# Patient Record
Sex: FEMALE | Race: BLACK OR AFRICAN AMERICAN | NOT HISPANIC OR LATINO | Employment: OTHER | URBAN - METROPOLITAN AREA
[De-identification: names, ages, dates, MRNs, and addresses within clinical notes are randomized per-mention and may not be internally consistent; named-entity substitution may affect disease eponyms.]

---

## 2018-03-15 ENCOUNTER — OFFICE VISIT CONVERTED (OUTPATIENT)
Dept: ORTHOPEDIC SURGERY | Facility: CLINIC | Age: 63
End: 2018-03-15
Attending: ORTHOPAEDIC SURGERY

## 2018-03-15 ENCOUNTER — OFFICE VISIT CONVERTED (OUTPATIENT)
Dept: FAMILY MEDICINE CLINIC | Age: 63
End: 2018-03-15
Attending: NURSE PRACTITIONER

## 2018-07-09 ENCOUNTER — OFFICE VISIT CONVERTED (OUTPATIENT)
Dept: FAMILY MEDICINE CLINIC | Age: 63
End: 2018-07-09
Attending: NURSE PRACTITIONER

## 2018-07-09 ENCOUNTER — CONVERSION ENCOUNTER (OUTPATIENT)
Dept: OTHER | Facility: HOSPITAL | Age: 63
End: 2018-07-09

## 2018-11-12 ENCOUNTER — OFFICE VISIT CONVERTED (OUTPATIENT)
Dept: FAMILY MEDICINE CLINIC | Age: 63
End: 2018-11-12
Attending: NURSE PRACTITIONER

## 2018-11-19 ENCOUNTER — OFFICE VISIT CONVERTED (OUTPATIENT)
Dept: FAMILY MEDICINE CLINIC | Age: 63
End: 2018-11-19
Attending: NURSE PRACTITIONER

## 2019-02-06 ENCOUNTER — OFFICE VISIT CONVERTED (OUTPATIENT)
Dept: FAMILY MEDICINE CLINIC | Age: 64
End: 2019-02-06
Attending: NURSE PRACTITIONER

## 2019-02-06 ENCOUNTER — HOSPITAL ENCOUNTER (OUTPATIENT)
Dept: OTHER | Facility: HOSPITAL | Age: 64
Discharge: HOME | End: 2019-02-06
Attending: NURSE PRACTITIONER

## 2019-02-06 LAB
ANION GAP SERPL CALC-SCNC: 13 MMOL/L (ref 8–19)
BUN SERPL-MCNC: 23 MG/DL (ref 5–25)
BUN/CREAT SERPL: 21 {RATIO} (ref 6–20)
CALCIUM SERPL-MCNC: 9.2 MG/DL (ref 8.7–10.4)
CHLORIDE SERPL-SCNC: 111 MMOL/L (ref 99–111)
CONV CO2: 25 MMOL/L (ref 22–32)
CREAT UR-MCNC: 1.12 MG/DL (ref 0.5–0.9)
GFR SERPLBLD BASED ON 1.73 SQ M-ARVRAT: 60 ML/MIN/{1.73_M2}
GLUCOSE SERPL-MCNC: 81 MG/DL (ref 65–99)
OSMOLALITY SERPL CALC.SUM OF ELEC: 303 MOSM/KG (ref 273–304)
POTASSIUM SERPL-SCNC: 4.4 MMOL/L (ref 3.5–5.3)
SODIUM SERPL-SCNC: 145 MMOL/L (ref 135–147)

## 2019-04-03 ENCOUNTER — OFFICE VISIT CONVERTED (OUTPATIENT)
Dept: FAMILY MEDICINE CLINIC | Age: 64
End: 2019-04-03
Attending: NURSE PRACTITIONER

## 2019-04-29 ENCOUNTER — OFFICE VISIT CONVERTED (OUTPATIENT)
Dept: FAMILY MEDICINE CLINIC | Age: 64
End: 2019-04-29
Attending: NURSE PRACTITIONER

## 2019-07-16 ENCOUNTER — HOSPITAL ENCOUNTER (OUTPATIENT)
Dept: OTHER | Facility: HOSPITAL | Age: 64
Discharge: HOME | End: 2019-07-16
Attending: NURSE PRACTITIONER

## 2019-07-16 ENCOUNTER — OFFICE VISIT CONVERTED (OUTPATIENT)
Dept: FAMILY MEDICINE CLINIC | Age: 64
End: 2019-07-16
Attending: NURSE PRACTITIONER

## 2019-07-16 LAB
ALBUMIN SERPL-MCNC: 3.9 G/DL (ref 3.5–5)
ALBUMIN/GLOB SERPL: 1.1 {RATIO} (ref 1.4–2.6)
ALP SERPL-CCNC: 70 U/L (ref 43–160)
ALT SERPL-CCNC: 8 U/L (ref 10–40)
ANION GAP SERPL CALC-SCNC: 19 MMOL/L (ref 8–19)
AST SERPL-CCNC: 14 U/L (ref 15–50)
BILIRUB SERPL-MCNC: 0.23 MG/DL (ref 0.2–1.3)
BUN SERPL-MCNC: 21 MG/DL (ref 5–25)
BUN/CREAT SERPL: 20 {RATIO} (ref 6–20)
CALCIUM SERPL-MCNC: 9.1 MG/DL (ref 8.7–10.4)
CHLORIDE SERPL-SCNC: 105 MMOL/L (ref 99–111)
CONV CO2: 24 MMOL/L (ref 22–32)
CONV TOTAL PROTEIN: 7.3 G/DL (ref 6.3–8.2)
CREAT UR-MCNC: 1.03 MG/DL (ref 0.5–0.9)
GFR SERPLBLD BASED ON 1.73 SQ M-ARVRAT: >60 ML/MIN/{1.73_M2}
GLOBULIN UR ELPH-MCNC: 3.4 G/DL (ref 2–3.5)
GLUCOSE SERPL-MCNC: 89 MG/DL (ref 65–99)
OSMOLALITY SERPL CALC.SUM OF ELEC: 298 MOSM/KG (ref 273–304)
POTASSIUM SERPL-SCNC: 4.6 MMOL/L (ref 3.5–5.3)
SODIUM SERPL-SCNC: 143 MMOL/L (ref 135–147)
TSH SERPL-ACNC: 1.23 M[IU]/L (ref 0.27–4.2)

## 2019-09-04 ENCOUNTER — OFFICE VISIT CONVERTED (OUTPATIENT)
Dept: FAMILY MEDICINE CLINIC | Age: 64
End: 2019-09-04
Attending: NURSE PRACTITIONER

## 2019-09-12 ENCOUNTER — OFFICE VISIT CONVERTED (OUTPATIENT)
Dept: FAMILY MEDICINE CLINIC | Age: 64
End: 2019-09-12
Attending: NURSE PRACTITIONER

## 2019-10-03 ENCOUNTER — HOSPITAL ENCOUNTER (OUTPATIENT)
Dept: OTHER | Facility: HOSPITAL | Age: 64
Discharge: HOME | End: 2019-10-03
Attending: NURSE PRACTITIONER

## 2019-11-21 ENCOUNTER — OFFICE VISIT CONVERTED (OUTPATIENT)
Dept: FAMILY MEDICINE CLINIC | Age: 64
End: 2019-11-21
Attending: NURSE PRACTITIONER

## 2019-11-22 LAB
BUN SERPL-MCNC: 16 MG/DL
BUN/CREAT SERPL: 15
CALCIUM SERPL-MCNC: 9.9 MG/DL
CHLORIDE SERPL-SCNC: 106 MMOL/L
CO2 SERPL-SCNC: 25 MMOL/L
CREAT UR-MCNC: 1.08 MG/DL
GLUCOSE SERPL-MCNC: 78 MG/DL
MAGNESIUM SERPL-MCNC: 1.9 MG/DL
POTASSIUM SERPL-SCNC: 4.5 MMOL/L
SODIUM SERPL-SCNC: 147 MMOL/L

## 2020-01-22 ENCOUNTER — OFFICE VISIT CONVERTED (OUTPATIENT)
Dept: FAMILY MEDICINE CLINIC | Age: 65
End: 2020-01-22
Attending: NURSE PRACTITIONER

## 2020-03-02 ENCOUNTER — OFFICE VISIT CONVERTED (OUTPATIENT)
Dept: FAMILY MEDICINE CLINIC | Age: 65
End: 2020-03-02
Attending: NURSE PRACTITIONER

## 2020-03-30 ENCOUNTER — OFFICE VISIT CONVERTED (OUTPATIENT)
Dept: FAMILY MEDICINE CLINIC | Age: 65
End: 2020-03-30
Attending: NURSE PRACTITIONER

## 2020-05-11 ENCOUNTER — OFFICE VISIT CONVERTED (OUTPATIENT)
Dept: FAMILY MEDICINE CLINIC | Age: 65
End: 2020-05-11
Attending: NURSE PRACTITIONER

## 2020-06-15 ENCOUNTER — OFFICE VISIT CONVERTED (OUTPATIENT)
Dept: FAMILY MEDICINE CLINIC | Age: 65
End: 2020-06-15
Attending: NURSE PRACTITIONER

## 2020-06-18 LAB
ALBUMIN SERPL-MCNC: 3.9 G/DL
ALBUMIN/GLOB SERPL: 1.4 {RATIO}
ALP SERPL-CCNC: 62 IU/L
ALT SERPL-CCNC: 9 IU/L
AST SERPL-CCNC: 15 IU/L
BILIRUB SERPL-MCNC: 0.4 MG/DL
BUN SERPL-MCNC: 16 MG/DL
BUN/CREAT SERPL: 16
CALCIUM SERPL-MCNC: 9.9 MG/DL
CHLORIDE SERPL-SCNC: 102 MMOL/L
CO2 SERPL-SCNC: 25 MMOL/L
CONV TOTAL PROTEIN: 6.6 G/DL
CREAT UR-MCNC: 1.03 MG/DL
GLOBULIN UR ELPH-MCNC: 2.7 G/DL
GLUCOSE SERPL-MCNC: 110 MG/DL
POTASSIUM SERPL-SCNC: 4.7 MMOL/L
SODIUM SERPL-SCNC: 142 MMOL/L
TSH SERPL-ACNC: 2.03 UIU/ML

## 2020-06-25 ENCOUNTER — OFFICE VISIT CONVERTED (OUTPATIENT)
Dept: FAMILY MEDICINE CLINIC | Age: 65
End: 2020-06-25
Attending: NURSE PRACTITIONER

## 2020-09-15 ENCOUNTER — OFFICE VISIT CONVERTED (OUTPATIENT)
Dept: FAMILY MEDICINE CLINIC | Age: 65
End: 2020-09-15
Attending: NURSE PRACTITIONER

## 2020-11-02 ENCOUNTER — OFFICE VISIT CONVERTED (OUTPATIENT)
Dept: FAMILY MEDICINE CLINIC | Age: 65
End: 2020-11-02
Attending: NURSE PRACTITIONER

## 2021-01-05 ENCOUNTER — OFFICE VISIT CONVERTED (OUTPATIENT)
Dept: FAMILY MEDICINE CLINIC | Age: 66
End: 2021-01-05
Attending: NURSE PRACTITIONER

## 2021-03-15 ENCOUNTER — OFFICE VISIT CONVERTED (OUTPATIENT)
Dept: FAMILY MEDICINE CLINIC | Age: 66
End: 2021-03-15
Attending: NURSE PRACTITIONER

## 2021-03-16 LAB
ALBUMIN SERPL-MCNC: 3.9 G/DL
ALBUMIN/GLOB SERPL: 1.4 {RATIO}
ALP SERPL-CCNC: 66 IU/L
ALT SERPL-CCNC: 12 IU/L
AST SERPL-CCNC: 17 IU/L
BILIRUB SERPL-MCNC: <0.2 MG/DL
BUN SERPL-MCNC: 12 MG/DL
BUN/CREAT SERPL: 12
CALCIUM SERPL-MCNC: 9.6 MG/DL
CHLORIDE SERPL-SCNC: 107 MMOL/L
CHOLEST SERPL-MCNC: 226 MG/DL
CO2 SERPL-SCNC: 25 MMOL/L
CONV TOTAL PROTEIN: 6.7 G/DL
CREAT UR-MCNC: 1 MG/DL
GLOBULIN UR ELPH-MCNC: 2.8 G/DL
GLUCOSE SERPL-MCNC: 115 MG/DL
HDLC SERPL-MCNC: 56 MG/DL
LDLC SERPL CALC-MCNC: 155 MG/DL
POTASSIUM SERPL-SCNC: 4.8 MMOL/L
SODIUM SERPL-SCNC: 144 MMOL/L
TRIGL SERPL-MCNC: 85 MG/DL
VLDLC SERPL-MCNC: 15 MG/DL

## 2021-05-16 VITALS — WEIGHT: 180 LBS | HEIGHT: 64 IN | BODY MASS INDEX: 30.73 KG/M2

## 2021-06-10 ENCOUNTER — TELEPHONE (OUTPATIENT)
Dept: FAMILY MEDICINE CLINIC | Age: 66
End: 2021-06-10

## 2021-06-10 DIAGNOSIS — G47.09 OTHER INSOMNIA: Primary | ICD-10-CM

## 2021-06-14 RX ORDER — ZOLPIDEM TARTRATE 10 MG/1
10 TABLET ORAL NIGHTLY PRN
COMMUNITY
Start: 2021-03-15 | End: 2021-06-14 | Stop reason: SDUPTHER

## 2021-06-16 RX ORDER — ZOLPIDEM TARTRATE 10 MG/1
10 TABLET ORAL NIGHTLY
Qty: 90 TABLET | Refills: 0 | Status: SHIPPED | OUTPATIENT
Start: 2021-06-16 | End: 2021-09-14

## 2021-06-26 RX ORDER — ZOLPIDEM TARTRATE 10 MG/1
TABLET ORAL
Qty: 30 TABLET | OUTPATIENT
Start: 2021-06-26

## 2021-07-01 VITALS
HEIGHT: 65 IN | SYSTOLIC BLOOD PRESSURE: 153 MMHG | TEMPERATURE: 97.9 F | WEIGHT: 283.6 LBS | DIASTOLIC BLOOD PRESSURE: 93 MMHG | BODY MASS INDEX: 47.25 KG/M2 | HEART RATE: 92 BPM

## 2021-07-01 VITALS
TEMPERATURE: 97.9 F | HEART RATE: 88 BPM | BODY MASS INDEX: 44.89 KG/M2 | SYSTOLIC BLOOD PRESSURE: 147 MMHG | WEIGHT: 269.4 LBS | DIASTOLIC BLOOD PRESSURE: 68 MMHG | HEIGHT: 65 IN

## 2021-07-01 VITALS
DIASTOLIC BLOOD PRESSURE: 95 MMHG | WEIGHT: 211 LBS | HEART RATE: 87 BPM | HEIGHT: 65 IN | BODY MASS INDEX: 35.16 KG/M2 | SYSTOLIC BLOOD PRESSURE: 186 MMHG | TEMPERATURE: 97.4 F

## 2021-07-01 VITALS
HEART RATE: 88 BPM | BODY MASS INDEX: 47.15 KG/M2 | WEIGHT: 283 LBS | DIASTOLIC BLOOD PRESSURE: 73 MMHG | TEMPERATURE: 98.5 F | HEIGHT: 65 IN | SYSTOLIC BLOOD PRESSURE: 146 MMHG

## 2021-07-01 VITALS
WEIGHT: 250.4 LBS | HEIGHT: 65 IN | HEART RATE: 72 BPM | SYSTOLIC BLOOD PRESSURE: 144 MMHG | TEMPERATURE: 97.5 F | BODY MASS INDEX: 41.72 KG/M2 | DIASTOLIC BLOOD PRESSURE: 70 MMHG

## 2021-07-01 VITALS
WEIGHT: 276.2 LBS | BODY MASS INDEX: 46.02 KG/M2 | HEART RATE: 81 BPM | SYSTOLIC BLOOD PRESSURE: 147 MMHG | HEIGHT: 65 IN | TEMPERATURE: 98.1 F | DIASTOLIC BLOOD PRESSURE: 76 MMHG

## 2021-07-01 VITALS
HEART RATE: 84 BPM | BODY MASS INDEX: 35.32 KG/M2 | TEMPERATURE: 97.6 F | DIASTOLIC BLOOD PRESSURE: 78 MMHG | SYSTOLIC BLOOD PRESSURE: 138 MMHG | WEIGHT: 212 LBS | HEIGHT: 65 IN

## 2021-07-01 VITALS
HEIGHT: 65 IN | DIASTOLIC BLOOD PRESSURE: 65 MMHG | TEMPERATURE: 98.3 F | WEIGHT: 243.4 LBS | SYSTOLIC BLOOD PRESSURE: 148 MMHG | BODY MASS INDEX: 40.55 KG/M2 | HEART RATE: 76 BPM

## 2021-07-01 VITALS
BODY MASS INDEX: 33.72 KG/M2 | HEIGHT: 65 IN | DIASTOLIC BLOOD PRESSURE: 86 MMHG | HEART RATE: 99 BPM | SYSTOLIC BLOOD PRESSURE: 136 MMHG | WEIGHT: 202.4 LBS | TEMPERATURE: 98.9 F

## 2021-07-01 VITALS
TEMPERATURE: 98.3 F | WEIGHT: 223.8 LBS | HEIGHT: 65 IN | DIASTOLIC BLOOD PRESSURE: 66 MMHG | SYSTOLIC BLOOD PRESSURE: 146 MMHG | BODY MASS INDEX: 37.29 KG/M2 | HEART RATE: 85 BPM

## 2021-07-01 VITALS
DIASTOLIC BLOOD PRESSURE: 85 MMHG | HEIGHT: 65 IN | HEART RATE: 81 BPM | TEMPERATURE: 98.5 F | WEIGHT: 206.8 LBS | SYSTOLIC BLOOD PRESSURE: 135 MMHG | BODY MASS INDEX: 34.45 KG/M2

## 2021-07-02 VITALS
HEIGHT: 65 IN | DIASTOLIC BLOOD PRESSURE: 88 MMHG | HEART RATE: 79 BPM | BODY MASS INDEX: 48.42 KG/M2 | TEMPERATURE: 98.2 F | WEIGHT: 290.6 LBS | SYSTOLIC BLOOD PRESSURE: 153 MMHG

## 2021-07-02 VITALS
HEART RATE: 90 BPM | WEIGHT: 293 LBS | DIASTOLIC BLOOD PRESSURE: 96 MMHG | SYSTOLIC BLOOD PRESSURE: 169 MMHG | HEIGHT: 65 IN | BODY MASS INDEX: 48.82 KG/M2

## 2021-07-02 VITALS
WEIGHT: 293 LBS | HEIGHT: 65 IN | SYSTOLIC BLOOD PRESSURE: 123 MMHG | DIASTOLIC BLOOD PRESSURE: 97 MMHG | BODY MASS INDEX: 48.82 KG/M2 | HEART RATE: 77 BPM | TEMPERATURE: 97.6 F

## 2021-07-02 VITALS
WEIGHT: 293 LBS | SYSTOLIC BLOOD PRESSURE: 151 MMHG | HEART RATE: 80 BPM | TEMPERATURE: 94.9 F | HEIGHT: 65 IN | BODY MASS INDEX: 48.82 KG/M2 | DIASTOLIC BLOOD PRESSURE: 98 MMHG

## 2021-07-02 VITALS
DIASTOLIC BLOOD PRESSURE: 82 MMHG | WEIGHT: 288.2 LBS | TEMPERATURE: 97.9 F | BODY MASS INDEX: 48.02 KG/M2 | SYSTOLIC BLOOD PRESSURE: 149 MMHG | HEIGHT: 65 IN | HEART RATE: 87 BPM

## 2021-07-28 ENCOUNTER — OFFICE VISIT (OUTPATIENT)
Dept: FAMILY MEDICINE CLINIC | Age: 66
End: 2021-07-28

## 2021-07-28 VITALS
SYSTOLIC BLOOD PRESSURE: 153 MMHG | TEMPERATURE: 97.7 F | DIASTOLIC BLOOD PRESSURE: 92 MMHG | BODY MASS INDEX: 49.52 KG/M2 | WEIGHT: 293 LBS | HEART RATE: 79 BPM

## 2021-07-28 DIAGNOSIS — F41.8 ANXIETY WITH DEPRESSION: Primary | ICD-10-CM

## 2021-07-28 DIAGNOSIS — I10 ESSENTIAL (PRIMARY) HYPERTENSION: ICD-10-CM

## 2021-07-28 PROCEDURE — 99213 OFFICE O/P EST LOW 20 MIN: CPT | Performed by: NURSE PRACTITIONER

## 2021-07-28 RX ORDER — AMLODIPINE BESYLATE AND BENAZEPRIL HYDROCHLORIDE 5; 20 MG/1; MG/1
1 CAPSULE ORAL DAILY
COMMUNITY
End: 2022-02-22 | Stop reason: SDUPTHER

## 2021-07-28 RX ORDER — BUPROPION HYDROCHLORIDE 150 MG/1
150 TABLET ORAL DAILY
COMMUNITY
Start: 2021-05-07 | End: 2021-07-28 | Stop reason: DRUGHIGH

## 2021-07-28 RX ORDER — ESCITALOPRAM OXALATE 20 MG/1
20 TABLET ORAL DAILY
COMMUNITY
End: 2021-11-22

## 2021-07-28 RX ORDER — FUROSEMIDE 20 MG/1
20 TABLET ORAL DAILY PRN
COMMUNITY
Start: 2021-05-29 | End: 2023-03-21 | Stop reason: SDUPTHER

## 2021-07-28 RX ORDER — BUPROPION HYDROCHLORIDE 300 MG/1
300 TABLET ORAL DAILY
Qty: 90 TABLET | Refills: 1 | Status: SHIPPED | OUTPATIENT
Start: 2021-07-28 | End: 2021-11-22

## 2021-07-28 RX ORDER — NABUMETONE 500 MG/1
500 TABLET, FILM COATED ORAL 2 TIMES DAILY WITH MEALS
COMMUNITY
Start: 2021-06-03 | End: 2021-09-01

## 2021-08-03 PROBLEM — M19.90 ARTHRITIS: Status: ACTIVE | Noted: 2021-08-03

## 2021-08-03 PROBLEM — E66.01 MORBID (SEVERE) OBESITY DUE TO EXCESS CALORIES: Status: ACTIVE | Noted: 2019-01-14

## 2021-08-03 PROBLEM — M25.559 HIP PAIN: Status: ACTIVE | Noted: 2021-08-03

## 2021-08-03 PROBLEM — L71.9 ACNE ROSACEA: Status: ACTIVE | Noted: 2021-08-03

## 2021-08-03 PROBLEM — R53.83 FATIGUE: Status: ACTIVE | Noted: 2021-08-03

## 2021-08-03 PROBLEM — F41.9 ANXIETY: Status: ACTIVE | Noted: 2021-08-03

## 2021-08-03 PROBLEM — Z96.651 HISTORY OF TOTAL KNEE REPLACEMENT, RIGHT: Status: ACTIVE | Noted: 2018-03-16

## 2021-08-03 PROBLEM — J30.2 SEASONAL ALLERGIC RHINITIS: Status: ACTIVE | Noted: 2021-08-03

## 2021-08-03 RX ORDER — CELECOXIB 100 MG/1
100 CAPSULE ORAL
COMMUNITY
End: 2021-09-01 | Stop reason: ALTCHOICE

## 2021-08-09 ENCOUNTER — PREP FOR SURGERY (OUTPATIENT)
Dept: OTHER | Facility: HOSPITAL | Age: 66
End: 2021-08-09

## 2021-08-09 ENCOUNTER — CLINICAL SUPPORT (OUTPATIENT)
Dept: GASTROENTEROLOGY | Facility: CLINIC | Age: 66
End: 2021-08-09

## 2021-08-09 DIAGNOSIS — Z12.11 COLON CANCER SCREENING: Primary | ICD-10-CM

## 2021-08-09 RX ORDER — SODIUM, POTASSIUM,MAG SULFATES 17.5-3.13G
1 SOLUTION, RECONSTITUTED, ORAL ORAL EVERY 12 HOURS
Qty: 254 ML | Refills: 0 | Status: SHIPPED | OUTPATIENT
Start: 2021-08-09 | End: 2021-11-10

## 2021-09-01 RX ORDER — NABUMETONE 500 MG/1
TABLET, FILM COATED ORAL
Qty: 60 TABLET | Refills: 0 | Status: SHIPPED | OUTPATIENT
Start: 2021-09-01 | End: 2021-09-03

## 2021-09-03 RX ORDER — NABUMETONE 500 MG/1
500 TABLET, FILM COATED ORAL 2 TIMES DAILY WITH MEALS
Qty: 180 TABLET | Refills: 0 | Status: SHIPPED | OUTPATIENT
Start: 2021-09-03 | End: 2022-01-24 | Stop reason: ALTCHOICE

## 2021-09-09 RX ORDER — CELECOXIB 200 MG/1
200 CAPSULE ORAL
Qty: 90 CAPSULE | Refills: 0 | Status: CANCELLED | OUTPATIENT
Start: 2021-09-09

## 2021-09-14 DIAGNOSIS — G47.09 OTHER INSOMNIA: ICD-10-CM

## 2021-09-14 RX ORDER — ZOLPIDEM TARTRATE 10 MG/1
TABLET ORAL
Qty: 90 TABLET | Refills: 0 | Status: SHIPPED | OUTPATIENT
Start: 2021-09-14 | End: 2021-12-17

## 2021-11-10 ENCOUNTER — OFFICE VISIT (OUTPATIENT)
Dept: FAMILY MEDICINE CLINIC | Age: 66
End: 2021-11-10

## 2021-11-10 ENCOUNTER — OFFICE VISIT (OUTPATIENT)
Dept: CARDIOLOGY | Facility: CLINIC | Age: 66
End: 2021-11-10

## 2021-11-10 VITALS
HEART RATE: 111 BPM | DIASTOLIC BLOOD PRESSURE: 95 MMHG | WEIGHT: 293 LBS | HEIGHT: 64 IN | BODY MASS INDEX: 50.02 KG/M2 | SYSTOLIC BLOOD PRESSURE: 157 MMHG

## 2021-11-10 VITALS
DIASTOLIC BLOOD PRESSURE: 83 MMHG | HEIGHT: 65 IN | BODY MASS INDEX: 48.82 KG/M2 | HEART RATE: 90 BPM | SYSTOLIC BLOOD PRESSURE: 149 MMHG | WEIGHT: 293 LBS

## 2021-11-10 DIAGNOSIS — E66.01 MORBID (SEVERE) OBESITY DUE TO EXCESS CALORIES (HCC): Primary | ICD-10-CM

## 2021-11-10 DIAGNOSIS — R06.09 EXERTIONAL DYSPNEA: ICD-10-CM

## 2021-11-10 DIAGNOSIS — Z23 NEED FOR COVID-19 VACCINE: ICD-10-CM

## 2021-11-10 DIAGNOSIS — Z01.810 PREOPERATIVE CARDIOVASCULAR EXAMINATION: Primary | ICD-10-CM

## 2021-11-10 DIAGNOSIS — I10 ESSENTIAL HYPERTENSION: ICD-10-CM

## 2021-11-10 DIAGNOSIS — I10 ESSENTIAL (PRIMARY) HYPERTENSION: ICD-10-CM

## 2021-11-10 DIAGNOSIS — Z23 IMMUNIZATION DUE: ICD-10-CM

## 2021-11-10 DIAGNOSIS — E66.01 MORBID OBESITY WITH BMI OF 50.0-59.9, ADULT (HCC): ICD-10-CM

## 2021-11-10 DIAGNOSIS — F41.9 ANXIETY: ICD-10-CM

## 2021-11-10 PROCEDURE — 93000 ELECTROCARDIOGRAM COMPLETE: CPT | Performed by: INTERNAL MEDICINE

## 2021-11-10 PROCEDURE — 0003A COVID-19 (PFIZER): CPT | Performed by: NURSE PRACTITIONER

## 2021-11-10 PROCEDURE — 99204 OFFICE O/P NEW MOD 45 MIN: CPT | Performed by: INTERNAL MEDICINE

## 2021-11-10 PROCEDURE — 91300 COVID-19 (PFIZER): CPT | Performed by: NURSE PRACTITIONER

## 2021-11-10 PROCEDURE — 99213 OFFICE O/P EST LOW 20 MIN: CPT | Performed by: NURSE PRACTITIONER

## 2021-11-17 ENCOUNTER — TELEPHONE (OUTPATIENT)
Dept: CARDIOLOGY | Facility: CLINIC | Age: 66
End: 2021-11-17

## 2021-11-20 DIAGNOSIS — F41.8 ANXIETY WITH DEPRESSION: ICD-10-CM

## 2021-11-22 RX ORDER — ESCITALOPRAM OXALATE 20 MG/1
TABLET ORAL
Qty: 90 TABLET | Refills: 0 | Status: SHIPPED | OUTPATIENT
Start: 2021-11-22 | End: 2022-09-19

## 2021-11-22 RX ORDER — BUPROPION HYDROCHLORIDE 300 MG/1
300 TABLET ORAL DAILY
Qty: 90 TABLET | Refills: 1 | Status: SHIPPED | OUTPATIENT
Start: 2021-11-22 | End: 2022-08-17

## 2021-12-08 ENCOUNTER — OFFICE VISIT (OUTPATIENT)
Dept: FAMILY MEDICINE CLINIC | Age: 66
End: 2021-12-08

## 2021-12-08 VITALS
WEIGHT: 291 LBS | BODY MASS INDEX: 49.95 KG/M2 | SYSTOLIC BLOOD PRESSURE: 160 MMHG | HEART RATE: 72 BPM | DIASTOLIC BLOOD PRESSURE: 95 MMHG

## 2021-12-08 DIAGNOSIS — Z96.651 HISTORY OF TOTAL KNEE REPLACEMENT, RIGHT: ICD-10-CM

## 2021-12-08 DIAGNOSIS — Z12.31 ENCOUNTER FOR SCREENING MAMMOGRAM FOR MALIGNANT NEOPLASM OF BREAST: Primary | ICD-10-CM

## 2021-12-08 DIAGNOSIS — Z00.00 ROUTINE GENERAL MEDICAL EXAMINATION AT A HEALTH CARE FACILITY: ICD-10-CM

## 2021-12-08 DIAGNOSIS — Z78.0 POSTMENOPAUSAL: ICD-10-CM

## 2021-12-08 DIAGNOSIS — I10 ESSENTIAL (PRIMARY) HYPERTENSION: ICD-10-CM

## 2021-12-08 DIAGNOSIS — E66.01 MORBID (SEVERE) OBESITY DUE TO EXCESS CALORIES (HCC): ICD-10-CM

## 2021-12-08 DIAGNOSIS — Z12.11 SCREENING FOR COLON CANCER: ICD-10-CM

## 2021-12-08 PROCEDURE — 1170F FXNL STATUS ASSESSED: CPT | Performed by: NURSE PRACTITIONER

## 2021-12-08 PROCEDURE — G0439 PPPS, SUBSEQ VISIT: HCPCS | Performed by: NURSE PRACTITIONER

## 2021-12-08 PROCEDURE — 1159F MED LIST DOCD IN RCRD: CPT | Performed by: NURSE PRACTITIONER

## 2021-12-08 PROCEDURE — 96160 PT-FOCUSED HLTH RISK ASSMT: CPT | Performed by: NURSE PRACTITIONER

## 2021-12-11 DIAGNOSIS — G47.09 OTHER INSOMNIA: ICD-10-CM

## 2021-12-14 ENCOUNTER — TRANSCRIBE ORDERS (OUTPATIENT)
Dept: ADMINISTRATIVE | Facility: HOSPITAL | Age: 66
End: 2021-12-14

## 2021-12-14 DIAGNOSIS — Z12.31 BREAST CANCER SCREENING BY MAMMOGRAM: Primary | ICD-10-CM

## 2021-12-15 ENCOUNTER — TELEPHONE (OUTPATIENT)
Dept: FAMILY MEDICINE CLINIC | Age: 66
End: 2021-12-15

## 2021-12-16 ENCOUNTER — APPOINTMENT (OUTPATIENT)
Dept: NUCLEAR MEDICINE | Facility: HOSPITAL | Age: 66
End: 2021-12-16

## 2021-12-17 ENCOUNTER — APPOINTMENT (OUTPATIENT)
Dept: NUCLEAR MEDICINE | Facility: HOSPITAL | Age: 66
End: 2021-12-17

## 2021-12-17 RX ORDER — ZOLPIDEM TARTRATE 10 MG/1
TABLET ORAL
Qty: 90 TABLET | Refills: 0 | Status: SHIPPED | OUTPATIENT
Start: 2021-12-17 | End: 2022-03-11 | Stop reason: SDUPTHER

## 2022-01-24 ENCOUNTER — TELEPHONE (OUTPATIENT)
Dept: FAMILY MEDICINE CLINIC | Age: 67
End: 2022-01-24

## 2022-01-24 ENCOUNTER — TELEMEDICINE (OUTPATIENT)
Dept: FAMILY MEDICINE CLINIC | Age: 67
End: 2022-01-24

## 2022-01-24 DIAGNOSIS — M54.9 MID-BACK PAIN, ACUTE: ICD-10-CM

## 2022-01-24 DIAGNOSIS — M54.9 MID-BACK PAIN, ACUTE: Primary | ICD-10-CM

## 2022-01-24 DIAGNOSIS — I10 ESSENTIAL (PRIMARY) HYPERTENSION: ICD-10-CM

## 2022-01-24 PROBLEM — M54.50 ACUTE BILATERAL LOW BACK PAIN: Status: ACTIVE | Noted: 2022-01-24

## 2022-01-24 PROCEDURE — 99213 OFFICE O/P EST LOW 20 MIN: CPT | Performed by: NURSE PRACTITIONER

## 2022-01-24 RX ORDER — CELECOXIB 200 MG/1
200 CAPSULE ORAL
Qty: 30 CAPSULE | Refills: 1 | Status: SHIPPED | OUTPATIENT
Start: 2022-01-24 | End: 2022-01-24 | Stop reason: DRUGHIGH

## 2022-01-24 RX ORDER — CELECOXIB 100 MG/1
100 CAPSULE ORAL 2 TIMES DAILY WITH MEALS
Qty: 60 CAPSULE | Refills: 0 | Status: SHIPPED | OUTPATIENT
Start: 2022-01-24 | End: 2022-01-24 | Stop reason: SDUPTHER

## 2022-01-24 RX ORDER — CELECOXIB 100 MG/1
100 CAPSULE ORAL 2 TIMES DAILY WITH MEALS
Qty: 60 CAPSULE | Refills: 0 | Status: SHIPPED | OUTPATIENT
Start: 2022-01-24 | End: 2022-02-03 | Stop reason: ALTCHOICE

## 2022-01-24 RX ORDER — METHOCARBAMOL 500 MG/1
500 TABLET, FILM COATED ORAL 3 TIMES DAILY PRN
Qty: 60 TABLET | Refills: 1 | Status: SHIPPED | OUTPATIENT
Start: 2022-01-24 | End: 2022-09-27

## 2022-01-25 ENCOUNTER — TELEPHONE (OUTPATIENT)
Dept: FAMILY MEDICINE CLINIC | Age: 67
End: 2022-01-25

## 2022-01-25 DIAGNOSIS — M54.50 ACUTE BILATERAL LOW BACK PAIN, UNSPECIFIED WHETHER SCIATICA PRESENT: Primary | ICD-10-CM

## 2022-01-25 RX ORDER — IBUPROFEN 800 MG/1
800 TABLET ORAL 3 TIMES DAILY PRN
Qty: 30 TABLET | Refills: 0 | Status: SHIPPED | OUTPATIENT
Start: 2022-01-25 | End: 2022-03-22 | Stop reason: ALTCHOICE

## 2022-02-01 LAB
ALBUMIN SERPL-MCNC: 3.9 G/DL (ref 3.8–4.8)
ALBUMIN/GLOB SERPL: 1.4 {RATIO} (ref 1.2–2.2)
ALP SERPL-CCNC: 71 IU/L (ref 44–121)
ALT SERPL-CCNC: 10 IU/L (ref 0–32)
AMBIG ABBREV CMP14 DEFAULT: NORMAL
AMBIG ABBREV LP DEFAULT: NORMAL
AST SERPL-CCNC: 12 IU/L (ref 0–40)
BILIRUB SERPL-MCNC: <0.2 MG/DL (ref 0–1.2)
BUN SERPL-MCNC: 15 MG/DL (ref 8–27)
BUN/CREAT SERPL: 16 (ref 12–28)
CALCIUM SERPL-MCNC: 9.3 MG/DL (ref 8.7–10.3)
CHLORIDE SERPL-SCNC: 107 MMOL/L (ref 96–106)
CHOLEST SERPL-MCNC: 218 MG/DL (ref 100–199)
CO2 SERPL-SCNC: 23 MMOL/L (ref 20–29)
CREAT SERPL-MCNC: 0.92 MG/DL (ref 0.57–1)
GLOBULIN SER CALC-MCNC: 2.7 G/DL (ref 1.5–4.5)
GLUCOSE SERPL-MCNC: 94 MG/DL (ref 65–99)
HDLC SERPL-MCNC: 52 MG/DL
LDLC SERPL CALC-MCNC: 147 MG/DL (ref 0–99)
POTASSIUM SERPL-SCNC: 4.3 MMOL/L (ref 3.5–5.2)
PROT SERPL-MCNC: 6.6 G/DL (ref 6–8.5)
SODIUM SERPL-SCNC: 145 MMOL/L (ref 134–144)
TRIGL SERPL-MCNC: 104 MG/DL (ref 0–149)
VLDLC SERPL CALC-MCNC: 19 MG/DL (ref 5–40)

## 2022-02-02 ENCOUNTER — OFFICE VISIT (OUTPATIENT)
Dept: FAMILY MEDICINE CLINIC | Age: 67
End: 2022-02-02

## 2022-02-02 VITALS
SYSTOLIC BLOOD PRESSURE: 158 MMHG | DIASTOLIC BLOOD PRESSURE: 88 MMHG | BODY MASS INDEX: 49.85 KG/M2 | HEIGHT: 64 IN | HEART RATE: 80 BPM | TEMPERATURE: 98.6 F | OXYGEN SATURATION: 97 % | WEIGHT: 292 LBS

## 2022-02-02 DIAGNOSIS — I10 ESSENTIAL (PRIMARY) HYPERTENSION: Primary | ICD-10-CM

## 2022-02-02 DIAGNOSIS — M54.50 ACUTE BILATERAL LOW BACK PAIN WITHOUT SCIATICA: ICD-10-CM

## 2022-02-02 DIAGNOSIS — E66.01 MORBID OBESITY WITH BMI OF 50.0-59.9, ADULT: ICD-10-CM

## 2022-02-02 DIAGNOSIS — E66.01 MORBID (SEVERE) OBESITY DUE TO EXCESS CALORIES: ICD-10-CM

## 2022-02-02 DIAGNOSIS — Z98.84 STATUS FOLLOWING GASTRIC BANDING SURGERY FOR WEIGHT LOSS: ICD-10-CM

## 2022-02-02 PROCEDURE — 99214 OFFICE O/P EST MOD 30 MIN: CPT | Performed by: NURSE PRACTITIONER

## 2022-02-22 DIAGNOSIS — I10 ESSENTIAL (PRIMARY) HYPERTENSION: Primary | ICD-10-CM

## 2022-02-22 RX ORDER — AMLODIPINE BESYLATE AND BENAZEPRIL HYDROCHLORIDE 5; 20 MG/1; MG/1
1 CAPSULE ORAL DAILY
Qty: 90 CAPSULE | Refills: 1 | Status: SHIPPED | OUTPATIENT
Start: 2022-02-22 | End: 2022-09-26 | Stop reason: SDUPTHER

## 2022-03-03 DIAGNOSIS — G47.09 OTHER INSOMNIA: ICD-10-CM

## 2022-03-07 DIAGNOSIS — G47.09 OTHER INSOMNIA: ICD-10-CM

## 2022-03-07 RX ORDER — ZOLPIDEM TARTRATE 10 MG/1
TABLET ORAL
Qty: 90 TABLET | OUTPATIENT
Start: 2022-03-07

## 2022-03-14 RX ORDER — ZOLPIDEM TARTRATE 10 MG/1
10 TABLET ORAL NIGHTLY
Qty: 90 TABLET | Refills: 0 | Status: SHIPPED | OUTPATIENT
Start: 2022-03-14 | End: 2022-06-14

## 2022-03-22 DIAGNOSIS — M54.9 MID-BACK PAIN, ACUTE: ICD-10-CM

## 2022-03-22 RX ORDER — CELECOXIB 100 MG/1
100 CAPSULE ORAL 2 TIMES DAILY WITH MEALS
COMMUNITY
Start: 2022-02-22 | End: 2022-03-22 | Stop reason: ALTCHOICE

## 2022-03-22 RX ORDER — CELECOXIB 100 MG/1
CAPSULE ORAL
Qty: 60 CAPSULE | Refills: 0 | Status: SHIPPED | OUTPATIENT
Start: 2022-03-22 | End: 2022-05-03

## 2022-04-05 ENCOUNTER — TELEMEDICINE (OUTPATIENT)
Dept: FAMILY MEDICINE CLINIC | Age: 67
End: 2022-04-05

## 2022-04-05 DIAGNOSIS — R05.9 COUGH: Primary | ICD-10-CM

## 2022-04-05 PROCEDURE — 99213 OFFICE O/P EST LOW 20 MIN: CPT | Performed by: NURSE PRACTITIONER

## 2022-04-05 RX ORDER — BENZONATATE 100 MG/1
100 CAPSULE ORAL 3 TIMES DAILY PRN
Qty: 30 CAPSULE | Refills: 0 | Status: SHIPPED | OUTPATIENT
Start: 2022-04-05 | End: 2022-05-17

## 2022-04-05 RX ORDER — AZITHROMYCIN 250 MG/1
TABLET, FILM COATED ORAL
Qty: 6 TABLET | Refills: 0 | Status: SHIPPED | OUTPATIENT
Start: 2022-04-05 | End: 2022-05-17

## 2022-04-05 RX ORDER — METHYLPREDNISOLONE 4 MG/1
TABLET ORAL
Qty: 21 EACH | Refills: 0 | Status: SHIPPED | OUTPATIENT
Start: 2022-04-05 | End: 2022-05-17

## 2022-04-15 ENCOUNTER — TELEPHONE (OUTPATIENT)
Dept: FAMILY MEDICINE CLINIC | Age: 67
End: 2022-04-15

## 2022-04-25 ENCOUNTER — TELEPHONE (OUTPATIENT)
Dept: FAMILY MEDICINE CLINIC | Age: 67
End: 2022-04-25

## 2022-05-03 DIAGNOSIS — M54.9 MID-BACK PAIN, ACUTE: ICD-10-CM

## 2022-05-03 RX ORDER — CELECOXIB 100 MG/1
CAPSULE ORAL
Qty: 60 CAPSULE | Refills: 0 | Status: SHIPPED | OUTPATIENT
Start: 2022-05-03 | End: 2022-06-21

## 2022-05-12 ENCOUNTER — TELEPHONE (OUTPATIENT)
Dept: FAMILY MEDICINE CLINIC | Age: 67
End: 2022-05-12

## 2022-05-17 ENCOUNTER — OFFICE VISIT (OUTPATIENT)
Dept: FAMILY MEDICINE CLINIC | Age: 67
End: 2022-05-17

## 2022-05-17 VITALS
DIASTOLIC BLOOD PRESSURE: 93 MMHG | BODY MASS INDEX: 51.01 KG/M2 | HEART RATE: 76 BPM | WEIGHT: 293 LBS | SYSTOLIC BLOOD PRESSURE: 154 MMHG

## 2022-05-17 DIAGNOSIS — Z79.899 HIGH RISK MEDICATION USE: ICD-10-CM

## 2022-05-17 DIAGNOSIS — F41.8 ANXIETY WITH DEPRESSION: ICD-10-CM

## 2022-05-17 DIAGNOSIS — I10 ESSENTIAL (PRIMARY) HYPERTENSION: Primary | ICD-10-CM

## 2022-05-17 DIAGNOSIS — E65 CENTRAL OBESITY: ICD-10-CM

## 2022-05-17 LAB
AMPHET+METHAMPHET UR QL: NEGATIVE
AMPHETAMINES UR QL: NEGATIVE
BARBITURATES UR QL SCN: NEGATIVE
BENZODIAZ UR QL SCN: NEGATIVE
BUPRENORPHINE SERPL-MCNC: NEGATIVE NG/ML
CANNABINOIDS SERPL QL: POSITIVE
COCAINE UR QL: NEGATIVE
EXPIRATION DATE: ABNORMAL
Lab: ABNORMAL
MDMA UR QL SCN: NEGATIVE
METHADONE UR QL SCN: NEGATIVE
OPIATES UR QL: NEGATIVE
OXYCODONE UR QL SCN: NEGATIVE
PCP UR QL SCN: NEGATIVE

## 2022-05-17 PROCEDURE — 99214 OFFICE O/P EST MOD 30 MIN: CPT | Performed by: NURSE PRACTITIONER

## 2022-05-17 PROCEDURE — 80305 DRUG TEST PRSMV DIR OPT OBS: CPT | Performed by: NURSE PRACTITIONER

## 2022-06-07 ENCOUNTER — TELEPHONE (OUTPATIENT)
Dept: FAMILY MEDICINE CLINIC | Age: 67
End: 2022-06-07

## 2022-06-09 ENCOUNTER — TELEPHONE (OUTPATIENT)
Dept: FAMILY MEDICINE CLINIC | Age: 67
End: 2022-06-09

## 2022-06-14 DIAGNOSIS — G47.09 OTHER INSOMNIA: ICD-10-CM

## 2022-06-14 RX ORDER — ZOLPIDEM TARTRATE 10 MG/1
10 TABLET ORAL NIGHTLY
Qty: 90 TABLET | Refills: 0 | Status: SHIPPED | OUTPATIENT
Start: 2022-06-14 | End: 2022-09-12

## 2022-06-16 ENCOUNTER — OFFICE VISIT (OUTPATIENT)
Dept: FAMILY MEDICINE CLINIC | Age: 67
End: 2022-06-16

## 2022-06-16 VITALS
HEART RATE: 78 BPM | DIASTOLIC BLOOD PRESSURE: 98 MMHG | HEIGHT: 64 IN | BODY MASS INDEX: 49.82 KG/M2 | WEIGHT: 291.8 LBS | SYSTOLIC BLOOD PRESSURE: 153 MMHG | OXYGEN SATURATION: 95 % | TEMPERATURE: 98.2 F

## 2022-06-16 DIAGNOSIS — U07.1 COVID-19: ICD-10-CM

## 2022-06-16 DIAGNOSIS — I10 ESSENTIAL (PRIMARY) HYPERTENSION: ICD-10-CM

## 2022-06-16 DIAGNOSIS — R05.9 COUGH: ICD-10-CM

## 2022-06-16 DIAGNOSIS — Z20.822 CLOSE EXPOSURE TO COVID-19 VIRUS: Primary | ICD-10-CM

## 2022-06-16 LAB
EXPIRATION DATE: ABNORMAL
FLUAV AG UPPER RESP QL IA.RAPID: NOT DETECTED
FLUBV AG UPPER RESP QL IA.RAPID: NOT DETECTED
INTERNAL CONTROL: ABNORMAL
Lab: ABNORMAL
SARS-COV-2 AG UPPER RESP QL IA.RAPID: DETECTED

## 2022-06-16 PROCEDURE — 87428 SARSCOV & INF VIR A&B AG IA: CPT | Performed by: NURSE PRACTITIONER

## 2022-06-16 PROCEDURE — 99213 OFFICE O/P EST LOW 20 MIN: CPT | Performed by: NURSE PRACTITIONER

## 2022-06-20 DIAGNOSIS — M54.9 MID-BACK PAIN, ACUTE: ICD-10-CM

## 2022-06-21 RX ORDER — CELECOXIB 100 MG/1
CAPSULE ORAL
Qty: 60 CAPSULE | Refills: 0 | Status: SHIPPED | OUTPATIENT
Start: 2022-06-21 | End: 2022-08-10

## 2022-07-13 ENCOUNTER — LAB (OUTPATIENT)
Dept: LAB | Facility: HOSPITAL | Age: 67
End: 2022-07-13

## 2022-07-13 DIAGNOSIS — I10 ESSENTIAL (PRIMARY) HYPERTENSION: ICD-10-CM

## 2022-07-13 LAB
ALBUMIN SERPL-MCNC: 4.3 G/DL (ref 3.5–5.2)
ALBUMIN/GLOB SERPL: 1.4 G/DL
ALP SERPL-CCNC: 74 U/L (ref 39–117)
ALT SERPL W P-5'-P-CCNC: 10 U/L (ref 1–33)
ANION GAP SERPL CALCULATED.3IONS-SCNC: 9.8 MMOL/L (ref 5–15)
AST SERPL-CCNC: 13 U/L (ref 1–32)
BILIRUB SERPL-MCNC: 0.3 MG/DL (ref 0–1.2)
BUN SERPL-MCNC: 16 MG/DL (ref 8–23)
BUN/CREAT SERPL: 14.5 (ref 7–25)
CALCIUM SPEC-SCNC: 9.7 MG/DL (ref 8.6–10.5)
CHLORIDE SERPL-SCNC: 106 MMOL/L (ref 98–107)
CHOLEST SERPL-MCNC: 230 MG/DL (ref 0–200)
CO2 SERPL-SCNC: 26.2 MMOL/L (ref 22–29)
CREAT SERPL-MCNC: 1.1 MG/DL (ref 0.57–1)
EGFRCR SERPLBLD CKD-EPI 2021: 55.2 ML/MIN/1.73
GLOBULIN UR ELPH-MCNC: 3.1 GM/DL
GLUCOSE SERPL-MCNC: 94 MG/DL (ref 65–99)
HDLC SERPL-MCNC: 54 MG/DL (ref 40–60)
LDLC SERPL CALC-MCNC: 159 MG/DL (ref 0–100)
LDLC/HDLC SERPL: 2.9 {RATIO}
POTASSIUM SERPL-SCNC: 4.3 MMOL/L (ref 3.5–5.2)
PROT SERPL-MCNC: 7.4 G/DL (ref 6–8.5)
SODIUM SERPL-SCNC: 142 MMOL/L (ref 136–145)
TRIGL SERPL-MCNC: 97 MG/DL (ref 0–150)
VLDLC SERPL-MCNC: 17 MG/DL (ref 5–40)

## 2022-07-13 PROCEDURE — 36415 COLL VENOUS BLD VENIPUNCTURE: CPT

## 2022-07-13 PROCEDURE — 80061 LIPID PANEL: CPT

## 2022-07-13 PROCEDURE — 80053 COMPREHEN METABOLIC PANEL: CPT

## 2022-08-10 DIAGNOSIS — G47.09 OTHER INSOMNIA: ICD-10-CM

## 2022-08-10 DIAGNOSIS — M54.9 MID-BACK PAIN, ACUTE: ICD-10-CM

## 2022-08-11 RX ORDER — ZOLPIDEM TARTRATE 10 MG/1
10 TABLET ORAL NIGHTLY
Qty: 90 TABLET | OUTPATIENT
Start: 2022-08-11

## 2022-08-11 RX ORDER — CELECOXIB 100 MG/1
CAPSULE ORAL
Qty: 60 CAPSULE | Refills: 2 | Status: SHIPPED | OUTPATIENT
Start: 2022-08-11 | End: 2023-03-08

## 2022-08-17 DIAGNOSIS — F41.8 ANXIETY WITH DEPRESSION: ICD-10-CM

## 2022-08-19 RX ORDER — BUPROPION HYDROCHLORIDE 300 MG/1
300 TABLET ORAL DAILY
Qty: 90 TABLET | Refills: 0 | Status: SHIPPED | OUTPATIENT
Start: 2022-08-19 | End: 2023-03-21 | Stop reason: SDUPTHER

## 2022-09-12 DIAGNOSIS — G47.09 OTHER INSOMNIA: ICD-10-CM

## 2022-09-12 RX ORDER — ZOLPIDEM TARTRATE 10 MG/1
10 TABLET ORAL NIGHTLY
Qty: 90 TABLET | Refills: 0 | Status: SHIPPED | OUTPATIENT
Start: 2022-09-12 | End: 2023-01-06

## 2022-09-19 RX ORDER — ESCITALOPRAM OXALATE 20 MG/1
TABLET ORAL
Qty: 90 TABLET | Refills: 0 | Status: SHIPPED | OUTPATIENT
Start: 2022-09-19 | End: 2022-12-16

## 2022-09-26 ENCOUNTER — OFFICE VISIT (OUTPATIENT)
Dept: FAMILY MEDICINE CLINIC | Age: 67
End: 2022-09-26

## 2022-09-26 ENCOUNTER — LAB (OUTPATIENT)
Dept: LAB | Facility: HOSPITAL | Age: 67
End: 2022-09-26

## 2022-09-26 VITALS
SYSTOLIC BLOOD PRESSURE: 149 MMHG | RESPIRATION RATE: 20 BRPM | WEIGHT: 290.8 LBS | DIASTOLIC BLOOD PRESSURE: 103 MMHG | HEART RATE: 85 BPM | HEIGHT: 64 IN | BODY MASS INDEX: 49.64 KG/M2

## 2022-09-26 DIAGNOSIS — I10 ESSENTIAL (PRIMARY) HYPERTENSION: ICD-10-CM

## 2022-09-26 DIAGNOSIS — E66.01 MORBID (SEVERE) OBESITY DUE TO EXCESS CALORIES: ICD-10-CM

## 2022-09-26 DIAGNOSIS — G47.09 OTHER INSOMNIA: ICD-10-CM

## 2022-09-26 DIAGNOSIS — Z11.59 SCREENING FOR VIRAL DISEASE: ICD-10-CM

## 2022-09-26 DIAGNOSIS — I10 ESSENTIAL (PRIMARY) HYPERTENSION: Primary | ICD-10-CM

## 2022-09-26 LAB
ANION GAP SERPL CALCULATED.3IONS-SCNC: 8 MMOL/L (ref 5–15)
BUN SERPL-MCNC: 14 MG/DL (ref 8–23)
BUN/CREAT SERPL: 13.5 (ref 7–25)
CALCIUM SPEC-SCNC: 10.1 MG/DL (ref 8.6–10.5)
CHLORIDE SERPL-SCNC: 103 MMOL/L (ref 98–107)
CO2 SERPL-SCNC: 29 MMOL/L (ref 22–29)
CREAT SERPL-MCNC: 1.04 MG/DL (ref 0.57–1)
EGFRCR SERPLBLD CKD-EPI 2021: 59 ML/MIN/1.73
GLUCOSE SERPL-MCNC: 111 MG/DL (ref 65–99)
POTASSIUM SERPL-SCNC: 4.2 MMOL/L (ref 3.5–5.2)
SODIUM SERPL-SCNC: 140 MMOL/L (ref 136–145)

## 2022-09-26 PROCEDURE — 80048 BASIC METABOLIC PNL TOTAL CA: CPT

## 2022-09-26 PROCEDURE — 36415 COLL VENOUS BLD VENIPUNCTURE: CPT

## 2022-09-26 PROCEDURE — 99214 OFFICE O/P EST MOD 30 MIN: CPT | Performed by: NURSE PRACTITIONER

## 2022-09-26 RX ORDER — AMLODIPINE BESYLATE AND BENAZEPRIL HYDROCHLORIDE 5; 20 MG/1; MG/1
1 CAPSULE ORAL DAILY
Qty: 90 CAPSULE | Refills: 1 | Status: SHIPPED | OUTPATIENT
Start: 2022-09-26

## 2022-09-27 DIAGNOSIS — M54.9 MID-BACK PAIN, ACUTE: ICD-10-CM

## 2022-09-27 RX ORDER — METHOCARBAMOL 500 MG/1
TABLET, FILM COATED ORAL
Qty: 60 TABLET | Refills: 1 | Status: SHIPPED | OUTPATIENT
Start: 2022-09-27

## 2022-11-01 ENCOUNTER — TELEPHONE (OUTPATIENT)
Dept: FAMILY MEDICINE CLINIC | Age: 67
End: 2022-11-01

## 2022-11-03 ENCOUNTER — TELEPHONE (OUTPATIENT)
Dept: FAMILY MEDICINE CLINIC | Age: 67
End: 2022-11-03

## 2022-11-08 DIAGNOSIS — R73.9 ELEVATED BLOOD SUGAR LEVEL: Primary | ICD-10-CM

## 2022-11-14 ENCOUNTER — TELEPHONE (OUTPATIENT)
Dept: FAMILY MEDICINE CLINIC | Age: 67
End: 2022-11-14

## 2022-11-16 ENCOUNTER — LAB (OUTPATIENT)
Dept: LAB | Facility: HOSPITAL | Age: 67
End: 2022-11-16

## 2022-11-16 DIAGNOSIS — R73.9 ELEVATED BLOOD SUGAR LEVEL: ICD-10-CM

## 2022-11-16 DIAGNOSIS — Z11.59 SCREENING FOR VIRAL DISEASE: ICD-10-CM

## 2022-11-16 LAB
HBA1C MFR BLD: 6 % (ref 4.8–5.6)
HCV AB SER DONR QL: NORMAL

## 2022-11-16 PROCEDURE — 83036 HEMOGLOBIN GLYCOSYLATED A1C: CPT

## 2022-11-16 PROCEDURE — 86803 HEPATITIS C AB TEST: CPT

## 2022-11-16 PROCEDURE — 36415 COLL VENOUS BLD VENIPUNCTURE: CPT

## 2022-12-16 RX ORDER — ESCITALOPRAM OXALATE 20 MG/1
TABLET ORAL
Qty: 90 TABLET | Refills: 0 | Status: SHIPPED | OUTPATIENT
Start: 2022-12-16

## 2023-01-05 DIAGNOSIS — G47.09 OTHER INSOMNIA: ICD-10-CM

## 2023-01-06 RX ORDER — ZOLPIDEM TARTRATE 10 MG/1
10 TABLET ORAL NIGHTLY
Qty: 30 TABLET | Refills: 0 | Status: SHIPPED | OUTPATIENT
Start: 2023-01-06 | End: 2023-03-21

## 2023-01-11 ENCOUNTER — TELEPHONE (OUTPATIENT)
Dept: FAMILY MEDICINE CLINIC | Age: 68
End: 2023-01-11
Payer: MEDICARE

## 2023-01-24 ENCOUNTER — OFFICE VISIT (OUTPATIENT)
Dept: FAMILY MEDICINE CLINIC | Age: 68
End: 2023-01-24
Payer: MEDICARE

## 2023-01-24 VITALS
OXYGEN SATURATION: 94 % | WEIGHT: 292 LBS | SYSTOLIC BLOOD PRESSURE: 163 MMHG | BODY MASS INDEX: 49.85 KG/M2 | HEIGHT: 64 IN | HEART RATE: 92 BPM | DIASTOLIC BLOOD PRESSURE: 105 MMHG | TEMPERATURE: 98 F

## 2023-01-24 DIAGNOSIS — Z79.899 LONG-TERM USE OF HIGH-RISK MEDICATION: Primary | ICD-10-CM

## 2023-01-24 DIAGNOSIS — I10 ESSENTIAL (PRIMARY) HYPERTENSION: ICD-10-CM

## 2023-01-24 DIAGNOSIS — Z79.899 HIGH RISK MEDICATION USE: ICD-10-CM

## 2023-01-24 DIAGNOSIS — E66.01 MORBID (SEVERE) OBESITY DUE TO EXCESS CALORIES: ICD-10-CM

## 2023-01-24 DIAGNOSIS — G47.09 OTHER INSOMNIA: ICD-10-CM

## 2023-01-24 PROCEDURE — 99214 OFFICE O/P EST MOD 30 MIN: CPT | Performed by: NURSE PRACTITIONER

## 2023-01-24 PROCEDURE — 80305 DRUG TEST PRSMV DIR OPT OBS: CPT | Performed by: NURSE PRACTITIONER

## 2023-01-24 RX ORDER — PHENTERMINE HYDROCHLORIDE 15 MG/1
15 CAPSULE ORAL DAILY
COMMUNITY
Start: 2023-01-06 | End: 2023-03-21 | Stop reason: ALTCHOICE

## 2023-02-03 RX ORDER — FUROSEMIDE 20 MG/1
20 TABLET ORAL DAILY PRN
Qty: 90 TABLET | Refills: 0 | Status: CANCELLED | OUTPATIENT
Start: 2023-02-03

## 2023-02-22 RX ORDER — FUROSEMIDE 20 MG/1
20 TABLET ORAL DAILY PRN
Status: CANCELLED | OUTPATIENT
Start: 2023-02-22

## 2023-03-07 DIAGNOSIS — I10 ESSENTIAL (PRIMARY) HYPERTENSION: Primary | ICD-10-CM

## 2023-03-07 DIAGNOSIS — M54.9 MID-BACK PAIN, ACUTE: ICD-10-CM

## 2023-03-08 RX ORDER — CELECOXIB 100 MG/1
100 CAPSULE ORAL 2 TIMES DAILY WITH MEALS
Qty: 60 CAPSULE | Refills: 0 | Status: SHIPPED | OUTPATIENT
Start: 2023-03-08

## 2023-03-20 ENCOUNTER — TELEPHONE (OUTPATIENT)
Dept: FAMILY MEDICINE CLINIC | Age: 68
End: 2023-03-20
Payer: MEDICARE

## 2023-03-21 ENCOUNTER — OFFICE VISIT (OUTPATIENT)
Dept: FAMILY MEDICINE CLINIC | Age: 68
End: 2023-03-21
Payer: MEDICARE

## 2023-03-21 ENCOUNTER — LAB (OUTPATIENT)
Dept: LAB | Facility: HOSPITAL | Age: 68
End: 2023-03-21
Payer: MEDICARE

## 2023-03-21 VITALS
SYSTOLIC BLOOD PRESSURE: 149 MMHG | HEART RATE: 76 BPM | HEIGHT: 64 IN | BODY MASS INDEX: 50.02 KG/M2 | WEIGHT: 293 LBS | DIASTOLIC BLOOD PRESSURE: 62 MMHG | RESPIRATION RATE: 18 BRPM

## 2023-03-21 DIAGNOSIS — R73.03 PRE-DIABETES: ICD-10-CM

## 2023-03-21 DIAGNOSIS — I10 ESSENTIAL (PRIMARY) HYPERTENSION: ICD-10-CM

## 2023-03-21 DIAGNOSIS — E66.01 OBESITY, MORBID, BMI 50 OR HIGHER: ICD-10-CM

## 2023-03-21 DIAGNOSIS — F41.8 ANXIETY WITH DEPRESSION: Primary | ICD-10-CM

## 2023-03-21 LAB
ANION GAP SERPL CALCULATED.3IONS-SCNC: 10.4 MMOL/L (ref 5–15)
BUN SERPL-MCNC: 16 MG/DL (ref 8–23)
BUN/CREAT SERPL: 15.7 (ref 7–25)
CALCIUM SPEC-SCNC: 9.5 MG/DL (ref 8.6–10.5)
CHLORIDE SERPL-SCNC: 106 MMOL/L (ref 98–107)
CO2 SERPL-SCNC: 26.6 MMOL/L (ref 22–29)
CREAT SERPL-MCNC: 1.02 MG/DL (ref 0.57–1)
EGFRCR SERPLBLD CKD-EPI 2021: 60 ML/MIN/1.73
GLUCOSE SERPL-MCNC: 117 MG/DL (ref 65–99)
POTASSIUM SERPL-SCNC: 5 MMOL/L (ref 3.5–5.2)
SODIUM SERPL-SCNC: 143 MMOL/L (ref 136–145)

## 2023-03-21 PROCEDURE — 36415 COLL VENOUS BLD VENIPUNCTURE: CPT

## 2023-03-21 PROCEDURE — 80048 BASIC METABOLIC PNL TOTAL CA: CPT

## 2023-03-21 RX ORDER — FUROSEMIDE 20 MG/1
20 TABLET ORAL DAILY PRN
Qty: 90 TABLET | Refills: 1 | Status: SHIPPED | OUTPATIENT
Start: 2023-03-21

## 2023-03-21 RX ORDER — BUPROPION HYDROCHLORIDE 300 MG/1
300 TABLET ORAL DAILY
Qty: 90 TABLET | Refills: 1 | Status: SHIPPED | OUTPATIENT
Start: 2023-03-21

## 2023-03-24 ENCOUNTER — TELEPHONE (OUTPATIENT)
Dept: FAMILY MEDICINE CLINIC | Age: 68
End: 2023-03-24
Payer: MEDICARE

## 2023-03-24 DIAGNOSIS — R73.03 PRE-DIABETES: Primary | ICD-10-CM

## 2023-03-24 DIAGNOSIS — E66.01 OBESITY, MORBID, BMI 50 OR HIGHER: ICD-10-CM

## 2023-03-27 ENCOUNTER — TELEPHONE (OUTPATIENT)
Dept: FAMILY MEDICINE CLINIC | Age: 68
End: 2023-03-27

## 2023-04-19 ENCOUNTER — TELEPHONE (OUTPATIENT)
Dept: FAMILY MEDICINE CLINIC | Age: 68
End: 2023-04-19
Payer: MEDICARE

## 2023-05-18 ENCOUNTER — LAB (OUTPATIENT)
Dept: LAB | Facility: HOSPITAL | Age: 68
End: 2023-05-18
Payer: MEDICARE

## 2023-05-18 ENCOUNTER — OFFICE VISIT (OUTPATIENT)
Dept: FAMILY MEDICINE CLINIC | Age: 68
End: 2023-05-18
Payer: MEDICARE

## 2023-05-18 VITALS
DIASTOLIC BLOOD PRESSURE: 75 MMHG | BODY MASS INDEX: 50.02 KG/M2 | WEIGHT: 293 LBS | SYSTOLIC BLOOD PRESSURE: 153 MMHG | HEART RATE: 85 BPM | HEIGHT: 64 IN

## 2023-05-18 DIAGNOSIS — Z12.31 SCREENING MAMMOGRAM FOR BREAST CANCER: ICD-10-CM

## 2023-05-18 DIAGNOSIS — R73.03 PRE-DIABETES: ICD-10-CM

## 2023-05-18 DIAGNOSIS — R21 RASH: ICD-10-CM

## 2023-05-18 DIAGNOSIS — I10 ESSENTIAL (PRIMARY) HYPERTENSION: ICD-10-CM

## 2023-05-18 DIAGNOSIS — E66.01 OBESITY, MORBID, BMI 50 OR HIGHER: ICD-10-CM

## 2023-05-18 DIAGNOSIS — M17.0 PRIMARY OSTEOARTHRITIS OF BOTH KNEES: ICD-10-CM

## 2023-05-18 DIAGNOSIS — Z78.0 POSTMENOPAUSAL: ICD-10-CM

## 2023-05-18 DIAGNOSIS — Z00.00 ROUTINE GENERAL MEDICAL EXAMINATION AT A HEALTH CARE FACILITY: Primary | ICD-10-CM

## 2023-05-18 LAB — HBA1C MFR BLD: 6.1 % (ref 4.8–5.6)

## 2023-05-18 PROCEDURE — 83036 HEMOGLOBIN GLYCOSYLATED A1C: CPT

## 2023-05-18 PROCEDURE — 36415 COLL VENOUS BLD VENIPUNCTURE: CPT

## 2023-05-18 RX ORDER — CELECOXIB 100 MG/1
100 CAPSULE ORAL 2 TIMES DAILY WITH MEALS
Qty: 60 CAPSULE | Refills: 2 | Status: SHIPPED | OUTPATIENT
Start: 2023-05-18

## 2023-05-18 RX ORDER — PRENATAL VIT 91/IRON/FOLIC/DHA 28-975-200
1 COMBINATION PACKAGE (EA) ORAL 2 TIMES DAILY
Qty: 30 G | Refills: 0 | Status: SHIPPED | OUTPATIENT
Start: 2023-05-18

## 2023-05-19 DIAGNOSIS — I10 ESSENTIAL (PRIMARY) HYPERTENSION: Primary | ICD-10-CM

## 2023-05-19 DIAGNOSIS — R73.03 PRE-DIABETES: ICD-10-CM

## 2023-06-05 ENCOUNTER — TELEPHONE (OUTPATIENT)
Dept: FAMILY MEDICINE CLINIC | Age: 68
End: 2023-06-05
Payer: MEDICARE

## 2023-06-06 ENCOUNTER — OFFICE VISIT (OUTPATIENT)
Dept: FAMILY MEDICINE CLINIC | Age: 68
End: 2023-06-06
Payer: COMMERCIAL

## 2023-06-06 VITALS
SYSTOLIC BLOOD PRESSURE: 164 MMHG | DIASTOLIC BLOOD PRESSURE: 90 MMHG | OXYGEN SATURATION: 97 % | WEIGHT: 293 LBS | HEART RATE: 81 BPM | HEIGHT: 64 IN | BODY MASS INDEX: 50.02 KG/M2

## 2023-06-06 DIAGNOSIS — M62.838 MUSCLE SPASM: Primary | ICD-10-CM

## 2023-06-06 DIAGNOSIS — M54.2 NECK PAIN: ICD-10-CM

## 2023-06-06 DIAGNOSIS — I10 ESSENTIAL (PRIMARY) HYPERTENSION: ICD-10-CM

## 2023-06-06 DIAGNOSIS — M54.9 MID-BACK PAIN, ACUTE: ICD-10-CM

## 2023-06-06 PROCEDURE — 99213 OFFICE O/P EST LOW 20 MIN: CPT | Performed by: NURSE PRACTITIONER

## 2023-06-06 RX ORDER — BACLOFEN 10 MG/1
10 TABLET ORAL 3 TIMES DAILY
Qty: 30 TABLET | Refills: 1 | Status: SHIPPED | OUTPATIENT
Start: 2023-06-06

## 2023-06-07 RX ORDER — AMLODIPINE BESYLATE AND BENAZEPRIL HYDROCHLORIDE 5; 20 MG/1; MG/1
1 CAPSULE ORAL DAILY
Qty: 90 CAPSULE | Refills: 1 | Status: SHIPPED | OUTPATIENT
Start: 2023-06-07

## 2023-06-07 RX ORDER — ESCITALOPRAM OXALATE 20 MG/1
20 TABLET ORAL DAILY
Qty: 90 TABLET | Refills: 0 | Status: SHIPPED | OUTPATIENT
Start: 2023-06-07

## 2023-06-09 ENCOUNTER — TELEPHONE (OUTPATIENT)
Dept: FAMILY MEDICINE CLINIC | Age: 68
End: 2023-06-09
Payer: MEDICARE

## 2023-06-20 PROBLEM — M54.2 NECK PAIN: Status: ACTIVE | Noted: 2023-06-20

## 2023-06-20 PROBLEM — M54.50 ACUTE LEFT-SIDED LOW BACK PAIN WITHOUT SCIATICA: Status: ACTIVE | Noted: 2023-06-20

## 2023-07-11 ENCOUNTER — TELEPHONE (OUTPATIENT)
Dept: FAMILY MEDICINE CLINIC | Age: 68
End: 2023-07-11

## 2023-07-24 ENCOUNTER — PRIOR AUTHORIZATION (OUTPATIENT)
Dept: FAMILY MEDICINE CLINIC | Age: 68
End: 2023-07-24
Payer: MEDICARE

## 2023-08-08 ENCOUNTER — LAB (OUTPATIENT)
Dept: LAB | Facility: HOSPITAL | Age: 68
End: 2023-08-08
Payer: MEDICARE

## 2023-08-08 ENCOUNTER — OFFICE VISIT (OUTPATIENT)
Dept: FAMILY MEDICINE CLINIC | Age: 68
End: 2023-08-08
Payer: MEDICARE

## 2023-08-08 VITALS
HEART RATE: 88 BPM | SYSTOLIC BLOOD PRESSURE: 174 MMHG | BODY MASS INDEX: 49.95 KG/M2 | WEIGHT: 292.6 LBS | DIASTOLIC BLOOD PRESSURE: 94 MMHG | HEIGHT: 64 IN

## 2023-08-08 DIAGNOSIS — I10 ESSENTIAL (PRIMARY) HYPERTENSION: ICD-10-CM

## 2023-08-08 DIAGNOSIS — R73.03 PRE-DIABETES: ICD-10-CM

## 2023-08-08 DIAGNOSIS — L73.9 FOLLICULITIS: Primary | ICD-10-CM

## 2023-08-08 PROCEDURE — 99214 OFFICE O/P EST MOD 30 MIN: CPT | Performed by: NURSE PRACTITIONER

## 2023-08-08 PROCEDURE — 36415 COLL VENOUS BLD VENIPUNCTURE: CPT | Performed by: NURSE PRACTITIONER

## 2023-08-08 PROCEDURE — 80053 COMPREHEN METABOLIC PANEL: CPT | Performed by: NURSE PRACTITIONER

## 2023-08-08 PROCEDURE — 80061 LIPID PANEL: CPT | Performed by: NURSE PRACTITIONER

## 2023-08-09 DIAGNOSIS — R73.03 PRE-DIABETES: Primary | ICD-10-CM

## 2023-08-09 LAB
ALBUMIN SERPL-MCNC: 4.2 G/DL (ref 3.5–5.2)
ALBUMIN/GLOB SERPL: 1.4 G/DL
ALP SERPL-CCNC: 72 U/L (ref 39–117)
ALT SERPL W P-5'-P-CCNC: 10 U/L (ref 1–33)
ANION GAP SERPL CALCULATED.3IONS-SCNC: 11.2 MMOL/L (ref 5–15)
AST SERPL-CCNC: 17 U/L (ref 1–32)
BILIRUB SERPL-MCNC: 0.5 MG/DL (ref 0–1.2)
BUN SERPL-MCNC: 13 MG/DL (ref 8–23)
BUN/CREAT SERPL: 11.3 (ref 7–25)
CALCIUM SPEC-SCNC: 9.7 MG/DL (ref 8.6–10.5)
CHLORIDE SERPL-SCNC: 103 MMOL/L (ref 98–107)
CHOLEST SERPL-MCNC: 240 MG/DL (ref 0–200)
CO2 SERPL-SCNC: 26.8 MMOL/L (ref 22–29)
CREAT SERPL-MCNC: 1.15 MG/DL (ref 0.57–1)
EGFRCR SERPLBLD CKD-EPI 2021: 52 ML/MIN/1.73
GLOBULIN UR ELPH-MCNC: 2.9 GM/DL
GLUCOSE SERPL-MCNC: 112 MG/DL (ref 65–99)
HDLC SERPL-MCNC: 55 MG/DL (ref 40–60)
LDLC SERPL CALC-MCNC: 169 MG/DL (ref 0–100)
LDLC/HDLC SERPL: 3.03 {RATIO}
POTASSIUM SERPL-SCNC: 4.7 MMOL/L (ref 3.5–5.2)
PROT SERPL-MCNC: 7.1 G/DL (ref 6–8.5)
SODIUM SERPL-SCNC: 141 MMOL/L (ref 136–145)
TRIGL SERPL-MCNC: 92 MG/DL (ref 0–150)
VLDLC SERPL-MCNC: 16 MG/DL (ref 5–40)

## 2023-08-30 ENCOUNTER — TELEPHONE (OUTPATIENT)
Dept: FAMILY MEDICINE CLINIC | Age: 68
End: 2023-08-30
Payer: MEDICARE

## 2023-09-15 ENCOUNTER — LAB (OUTPATIENT)
Dept: LAB | Facility: HOSPITAL | Age: 68
End: 2023-09-15
Payer: MEDICARE

## 2023-09-15 DIAGNOSIS — R73.03 PRE-DIABETES: ICD-10-CM

## 2023-09-15 LAB
ANION GAP SERPL CALCULATED.3IONS-SCNC: 10 MMOL/L (ref 5–15)
BASOPHILS # BLD AUTO: 0.04 10*3/MM3 (ref 0–0.2)
BASOPHILS NFR BLD AUTO: 0.5 % (ref 0–1.5)
BUN SERPL-MCNC: 12 MG/DL (ref 8–23)
BUN/CREAT SERPL: 10.6 (ref 7–25)
CALCIUM SPEC-SCNC: 10 MG/DL (ref 8.6–10.5)
CHLORIDE SERPL-SCNC: 104 MMOL/L (ref 98–107)
CO2 SERPL-SCNC: 27 MMOL/L (ref 22–29)
CREAT SERPL-MCNC: 1.13 MG/DL (ref 0.57–1)
DEPRECATED RDW RBC AUTO: 48 FL (ref 37–54)
EGFRCR SERPLBLD CKD-EPI 2021: 53.1 ML/MIN/1.73
EOSINOPHIL # BLD AUTO: 0.1 10*3/MM3 (ref 0–0.4)
EOSINOPHIL NFR BLD AUTO: 1.3 % (ref 0.3–6.2)
ERYTHROCYTE [DISTWIDTH] IN BLOOD BY AUTOMATED COUNT: 13.8 % (ref 12.3–15.4)
GLUCOSE SERPL-MCNC: 109 MG/DL (ref 65–99)
HCT VFR BLD AUTO: 45.3 % (ref 34–46.6)
HGB BLD-MCNC: 14.2 G/DL (ref 12–15.9)
IMM GRANULOCYTES # BLD AUTO: 0.01 10*3/MM3 (ref 0–0.05)
IMM GRANULOCYTES NFR BLD AUTO: 0.1 % (ref 0–0.5)
LYMPHOCYTES # BLD AUTO: 1.62 10*3/MM3 (ref 0.7–3.1)
LYMPHOCYTES NFR BLD AUTO: 20.5 % (ref 19.6–45.3)
MCH RBC QN AUTO: 29.3 PG (ref 26.6–33)
MCHC RBC AUTO-ENTMCNC: 31.3 G/DL (ref 31.5–35.7)
MCV RBC AUTO: 93.4 FL (ref 79–97)
MONOCYTES # BLD AUTO: 0.51 10*3/MM3 (ref 0.1–0.9)
MONOCYTES NFR BLD AUTO: 6.5 % (ref 5–12)
NEUTROPHILS NFR BLD AUTO: 5.62 10*3/MM3 (ref 1.7–7)
NEUTROPHILS NFR BLD AUTO: 71.1 % (ref 42.7–76)
PLATELET # BLD AUTO: 289 10*3/MM3 (ref 140–450)
PMV BLD AUTO: 10.4 FL (ref 6–12)
POTASSIUM SERPL-SCNC: 4.4 MMOL/L (ref 3.5–5.2)
RBC # BLD AUTO: 4.85 10*6/MM3 (ref 3.77–5.28)
SODIUM SERPL-SCNC: 141 MMOL/L (ref 136–145)
WBC NRBC COR # BLD: 7.9 10*3/MM3 (ref 3.4–10.8)

## 2023-09-15 PROCEDURE — 80048 BASIC METABOLIC PNL TOTAL CA: CPT

## 2023-09-15 PROCEDURE — 85025 COMPLETE CBC W/AUTO DIFF WBC: CPT

## 2023-09-15 PROCEDURE — 36415 COLL VENOUS BLD VENIPUNCTURE: CPT

## 2023-09-15 PROCEDURE — 83036 HEMOGLOBIN GLYCOSYLATED A1C: CPT

## 2023-09-18 DIAGNOSIS — R73.03 PRE-DIABETES: Primary | ICD-10-CM

## 2023-09-18 LAB — HBA1C MFR BLD: 6.2 % (ref 4.8–5.6)

## 2023-10-16 ENCOUNTER — TELEPHONE (OUTPATIENT)
Dept: FAMILY MEDICINE CLINIC | Age: 68
End: 2023-10-16

## 2023-10-16 ENCOUNTER — OFFICE VISIT (OUTPATIENT)
Dept: FAMILY MEDICINE CLINIC | Age: 68
End: 2023-10-16
Payer: MEDICARE

## 2023-10-16 VITALS
SYSTOLIC BLOOD PRESSURE: 151 MMHG | BODY MASS INDEX: 50.02 KG/M2 | HEART RATE: 85 BPM | WEIGHT: 293 LBS | HEIGHT: 64 IN | DIASTOLIC BLOOD PRESSURE: 86 MMHG | TEMPERATURE: 98 F

## 2023-10-16 DIAGNOSIS — E66.01 OBESITY, MORBID, BMI 50 OR HIGHER: ICD-10-CM

## 2023-10-16 DIAGNOSIS — R05.9 COUGH, UNSPECIFIED TYPE: Primary | ICD-10-CM

## 2023-10-16 DIAGNOSIS — I10 ESSENTIAL (PRIMARY) HYPERTENSION: ICD-10-CM

## 2023-10-16 DIAGNOSIS — E66.01 OBESITY, MORBID, BMI 50 OR HIGHER: Primary | ICD-10-CM

## 2023-10-16 DIAGNOSIS — F41.9 ANXIETY: ICD-10-CM

## 2023-10-16 DIAGNOSIS — M25.50 ARTHRALGIA, UNSPECIFIED JOINT: ICD-10-CM

## 2023-10-16 LAB
EXPIRATION DATE: NORMAL
FLUAV AG UPPER RESP QL IA.RAPID: NOT DETECTED
FLUBV AG UPPER RESP QL IA.RAPID: NOT DETECTED
INTERNAL CONTROL: NORMAL
Lab: NORMAL
SARS-COV-2 AG UPPER RESP QL IA.RAPID: NOT DETECTED

## 2023-10-30 ENCOUNTER — TELEPHONE (OUTPATIENT)
Dept: FAMILY MEDICINE CLINIC | Age: 68
End: 2023-10-30
Payer: MEDICARE

## 2023-12-07 ENCOUNTER — OFFICE VISIT (OUTPATIENT)
Dept: FAMILY MEDICINE CLINIC | Age: 68
End: 2023-12-07
Payer: MEDICARE

## 2023-12-07 VITALS
WEIGHT: 293 LBS | TEMPERATURE: 98 F | BODY MASS INDEX: 50.02 KG/M2 | HEART RATE: 81 BPM | HEIGHT: 64 IN | DIASTOLIC BLOOD PRESSURE: 87 MMHG | SYSTOLIC BLOOD PRESSURE: 154 MMHG

## 2023-12-07 DIAGNOSIS — M26.609 TMJ (TEMPOROMANDIBULAR JOINT DISORDER): ICD-10-CM

## 2023-12-07 DIAGNOSIS — I10 ESSENTIAL (PRIMARY) HYPERTENSION: ICD-10-CM

## 2023-12-07 DIAGNOSIS — R20.0 NUMBNESS: Primary | ICD-10-CM

## 2023-12-07 PROCEDURE — 3079F DIAST BP 80-89 MM HG: CPT | Performed by: NURSE PRACTITIONER

## 2023-12-07 PROCEDURE — 1159F MED LIST DOCD IN RCRD: CPT | Performed by: NURSE PRACTITIONER

## 2023-12-07 PROCEDURE — 1160F RVW MEDS BY RX/DR IN RCRD: CPT | Performed by: NURSE PRACTITIONER

## 2023-12-07 PROCEDURE — 99214 OFFICE O/P EST MOD 30 MIN: CPT | Performed by: NURSE PRACTITIONER

## 2023-12-07 PROCEDURE — 3077F SYST BP >= 140 MM HG: CPT | Performed by: NURSE PRACTITIONER

## 2023-12-07 RX ORDER — AMLODIPINE BESYLATE AND BENAZEPRIL HYDROCHLORIDE 5; 20 MG/1; MG/1
1 CAPSULE ORAL DAILY
Qty: 90 CAPSULE | Refills: 1 | Status: SHIPPED | OUTPATIENT
Start: 2023-12-07

## 2023-12-07 RX ORDER — METHYLPREDNISOLONE 4 MG/1
TABLET ORAL
Qty: 21 TABLET | Refills: 0 | Status: SHIPPED | OUTPATIENT
Start: 2023-12-07

## 2023-12-07 RX ORDER — FUROSEMIDE 20 MG/1
20 TABLET ORAL DAILY PRN
Qty: 90 TABLET | Refills: 1 | Status: SHIPPED | OUTPATIENT
Start: 2023-12-07

## 2024-01-22 RX ORDER — ESCITALOPRAM OXALATE 20 MG/1
20 TABLET ORAL DAILY
Qty: 90 TABLET | Refills: 0 | Status: CANCELLED | OUTPATIENT
Start: 2024-01-22

## 2024-01-22 RX ORDER — ESCITALOPRAM OXALATE 20 MG/1
20 TABLET ORAL DAILY
Qty: 90 TABLET | Refills: 1 | Status: SHIPPED | OUTPATIENT
Start: 2024-01-22

## 2024-01-30 ENCOUNTER — OFFICE VISIT (OUTPATIENT)
Dept: FAMILY MEDICINE CLINIC | Age: 69
End: 2024-01-30
Payer: MEDICARE

## 2024-01-30 VITALS
HEART RATE: 85 BPM | HEIGHT: 64 IN | BODY MASS INDEX: 50.02 KG/M2 | WEIGHT: 293 LBS | TEMPERATURE: 97.9 F | SYSTOLIC BLOOD PRESSURE: 164 MMHG | DIASTOLIC BLOOD PRESSURE: 77 MMHG

## 2024-01-30 DIAGNOSIS — E66.01 OBESITY, MORBID, BMI 50 OR HIGHER: ICD-10-CM

## 2024-01-30 DIAGNOSIS — R73.03 PRE-DIABETES: ICD-10-CM

## 2024-01-30 DIAGNOSIS — I10 ESSENTIAL (PRIMARY) HYPERTENSION: Primary | ICD-10-CM

## 2024-01-30 DIAGNOSIS — F41.9 ANXIETY: ICD-10-CM

## 2024-01-30 RX ORDER — BUPROPION HYDROCHLORIDE 300 MG/1
300 TABLET ORAL DAILY
COMMUNITY
End: 2024-01-30 | Stop reason: SDUPTHER

## 2024-01-30 RX ORDER — SEMAGLUTIDE 0.25 MG/.5ML
0.25 INJECTION, SOLUTION SUBCUTANEOUS WEEKLY
Qty: 0.5 ML | Refills: 1 | Status: SHIPPED | OUTPATIENT
Start: 2024-01-30

## 2024-01-30 RX ORDER — BUPROPION HYDROCHLORIDE 300 MG/1
300 TABLET ORAL DAILY
Qty: 90 TABLET | Refills: 1 | Status: SHIPPED | OUTPATIENT
Start: 2024-01-30

## 2024-02-19 ENCOUNTER — TELEPHONE (OUTPATIENT)
Dept: FAMILY MEDICINE CLINIC | Age: 69
End: 2024-02-19
Payer: MEDICARE

## 2024-02-27 ENCOUNTER — OFFICE VISIT (OUTPATIENT)
Dept: FAMILY MEDICINE CLINIC | Age: 69
End: 2024-02-27
Payer: MEDICARE

## 2024-02-27 ENCOUNTER — LAB (OUTPATIENT)
Dept: LAB | Facility: HOSPITAL | Age: 69
End: 2024-02-27
Payer: MEDICARE

## 2024-02-27 VITALS
HEIGHT: 64 IN | DIASTOLIC BLOOD PRESSURE: 89 MMHG | WEIGHT: 293 LBS | SYSTOLIC BLOOD PRESSURE: 158 MMHG | TEMPERATURE: 99.1 F | HEART RATE: 86 BPM | BODY MASS INDEX: 50.02 KG/M2

## 2024-02-27 DIAGNOSIS — F41.9 ANXIETY: ICD-10-CM

## 2024-02-27 DIAGNOSIS — R73.03 PRE-DIABETES: ICD-10-CM

## 2024-02-27 DIAGNOSIS — I10 ESSENTIAL (PRIMARY) HYPERTENSION: Primary | ICD-10-CM

## 2024-02-27 DIAGNOSIS — E66.01 OBESITY, MORBID, BMI 50 OR HIGHER: ICD-10-CM

## 2024-02-27 PROBLEM — U07.1 COVID-19: Status: RESOLVED | Noted: 2022-06-16 | Resolved: 2024-02-27

## 2024-02-27 LAB
ALBUMIN SERPL-MCNC: 4.1 G/DL (ref 3.5–5.2)
ALBUMIN/GLOB SERPL: 1.4 G/DL
ALP SERPL-CCNC: 68 U/L (ref 39–117)
ALT SERPL W P-5'-P-CCNC: 14 U/L (ref 1–33)
ANION GAP SERPL CALCULATED.3IONS-SCNC: 12.3 MMOL/L (ref 5–15)
AST SERPL-CCNC: 21 U/L (ref 1–32)
BILIRUB SERPL-MCNC: 0.3 MG/DL (ref 0–1.2)
BUN SERPL-MCNC: 12 MG/DL (ref 8–23)
BUN/CREAT SERPL: 11.5 (ref 7–25)
CALCIUM SPEC-SCNC: 9.7 MG/DL (ref 8.6–10.5)
CHLORIDE SERPL-SCNC: 103 MMOL/L (ref 98–107)
CHOLEST SERPL-MCNC: 222 MG/DL (ref 0–200)
CO2 SERPL-SCNC: 24.7 MMOL/L (ref 22–29)
CREAT SERPL-MCNC: 1.04 MG/DL (ref 0.57–1)
EGFRCR SERPLBLD CKD-EPI 2021: 58.3 ML/MIN/1.73
GLOBULIN UR ELPH-MCNC: 3 GM/DL
GLUCOSE SERPL-MCNC: 113 MG/DL (ref 65–99)
HBA1C MFR BLD: 6.2 % (ref 4.8–5.6)
HDLC SERPL-MCNC: 58 MG/DL (ref 40–60)
LDLC SERPL CALC-MCNC: 150 MG/DL (ref 0–100)
LDLC/HDLC SERPL: 2.56 {RATIO}
POTASSIUM SERPL-SCNC: 4.9 MMOL/L (ref 3.5–5.2)
PROT SERPL-MCNC: 7.1 G/DL (ref 6–8.5)
SODIUM SERPL-SCNC: 140 MMOL/L (ref 136–145)
TRIGL SERPL-MCNC: 79 MG/DL (ref 0–150)
VLDLC SERPL-MCNC: 14 MG/DL (ref 5–40)

## 2024-02-27 PROCEDURE — 80053 COMPREHEN METABOLIC PANEL: CPT | Performed by: NURSE PRACTITIONER

## 2024-02-27 PROCEDURE — 36415 COLL VENOUS BLD VENIPUNCTURE: CPT | Performed by: NURSE PRACTITIONER

## 2024-02-27 PROCEDURE — 80061 LIPID PANEL: CPT | Performed by: NURSE PRACTITIONER

## 2024-02-27 PROCEDURE — 83036 HEMOGLOBIN GLYCOSYLATED A1C: CPT

## 2024-02-27 RX ORDER — ESCITALOPRAM OXALATE 20 MG/1
20 TABLET ORAL DAILY
Qty: 90 TABLET | Refills: 1 | Status: SHIPPED | OUTPATIENT
Start: 2024-02-27

## 2024-04-04 ENCOUNTER — OFFICE VISIT (OUTPATIENT)
Dept: FAMILY MEDICINE CLINIC | Age: 69
End: 2024-04-04
Payer: MEDICARE

## 2024-04-04 VITALS
SYSTOLIC BLOOD PRESSURE: 186 MMHG | TEMPERATURE: 98 F | BODY MASS INDEX: 50.02 KG/M2 | DIASTOLIC BLOOD PRESSURE: 93 MMHG | HEIGHT: 64 IN | HEART RATE: 82 BPM | WEIGHT: 293 LBS

## 2024-04-04 DIAGNOSIS — F41.9 ANXIETY: ICD-10-CM

## 2024-04-04 DIAGNOSIS — M25.50 ARTHRALGIA, UNSPECIFIED JOINT: ICD-10-CM

## 2024-04-04 DIAGNOSIS — I10 ESSENTIAL (PRIMARY) HYPERTENSION: Primary | ICD-10-CM

## 2024-04-04 DIAGNOSIS — E66.01 OBESITY, MORBID, BMI 50 OR HIGHER: ICD-10-CM

## 2024-04-04 DIAGNOSIS — R73.03 PRE-DIABETES: ICD-10-CM

## 2024-05-02 ENCOUNTER — OFFICE VISIT (OUTPATIENT)
Dept: FAMILY MEDICINE CLINIC | Age: 69
End: 2024-05-02
Payer: MEDICARE

## 2024-05-02 VITALS
HEIGHT: 64 IN | WEIGHT: 291 LBS | HEART RATE: 91 BPM | SYSTOLIC BLOOD PRESSURE: 157 MMHG | BODY MASS INDEX: 49.68 KG/M2 | DIASTOLIC BLOOD PRESSURE: 108 MMHG

## 2024-05-02 DIAGNOSIS — I10 ESSENTIAL (PRIMARY) HYPERTENSION: ICD-10-CM

## 2024-05-02 DIAGNOSIS — R73.03 PRE-DIABETES: Primary | ICD-10-CM

## 2024-05-02 PROBLEM — Z20.822 CLOSE EXPOSURE TO COVID-19 VIRUS: Status: RESOLVED | Noted: 2022-06-16 | Resolved: 2024-05-02

## 2024-05-02 PROCEDURE — 99214 OFFICE O/P EST MOD 30 MIN: CPT | Performed by: NURSE PRACTITIONER

## 2024-05-02 PROCEDURE — 1160F RVW MEDS BY RX/DR IN RCRD: CPT | Performed by: NURSE PRACTITIONER

## 2024-05-02 PROCEDURE — 3077F SYST BP >= 140 MM HG: CPT | Performed by: NURSE PRACTITIONER

## 2024-05-02 PROCEDURE — 3080F DIAST BP >= 90 MM HG: CPT | Performed by: NURSE PRACTITIONER

## 2024-05-02 PROCEDURE — 1159F MED LIST DOCD IN RCRD: CPT | Performed by: NURSE PRACTITIONER

## 2024-05-08 ENCOUNTER — TELEPHONE (OUTPATIENT)
Dept: FAMILY MEDICINE CLINIC | Age: 69
End: 2024-05-08
Payer: MEDICARE

## 2024-06-03 ENCOUNTER — OFFICE VISIT (OUTPATIENT)
Dept: FAMILY MEDICINE CLINIC | Age: 69
End: 2024-06-03
Payer: MEDICARE

## 2024-06-03 VITALS
BODY MASS INDEX: 49.47 KG/M2 | TEMPERATURE: 98 F | SYSTOLIC BLOOD PRESSURE: 154 MMHG | WEIGHT: 289.8 LBS | DIASTOLIC BLOOD PRESSURE: 78 MMHG | HEART RATE: 89 BPM | HEIGHT: 64 IN

## 2024-06-03 DIAGNOSIS — B35.1 TOENAIL FUNGUS: ICD-10-CM

## 2024-06-03 DIAGNOSIS — I10 ESSENTIAL (PRIMARY) HYPERTENSION: Primary | ICD-10-CM

## 2024-06-03 DIAGNOSIS — R73.03 PRE-DIABETES: ICD-10-CM

## 2024-06-03 PROCEDURE — 99214 OFFICE O/P EST MOD 30 MIN: CPT | Performed by: NURSE PRACTITIONER

## 2024-06-03 PROCEDURE — 3078F DIAST BP <80 MM HG: CPT | Performed by: NURSE PRACTITIONER

## 2024-06-03 PROCEDURE — 3077F SYST BP >= 140 MM HG: CPT | Performed by: NURSE PRACTITIONER

## 2024-06-03 PROCEDURE — 1159F MED LIST DOCD IN RCRD: CPT | Performed by: NURSE PRACTITIONER

## 2024-06-03 PROCEDURE — 1160F RVW MEDS BY RX/DR IN RCRD: CPT | Performed by: NURSE PRACTITIONER

## 2024-06-03 RX ORDER — EFINACONAZOLE 100 MG/ML
1 SOLUTION TOPICAL
Qty: 8 ML | Refills: 2 | Status: SHIPPED | OUTPATIENT
Start: 2024-06-03 | End: 2024-06-07 | Stop reason: ALTCHOICE

## 2024-06-06 ENCOUNTER — PRIOR AUTHORIZATION (OUTPATIENT)
Dept: FAMILY MEDICINE CLINIC | Age: 69
End: 2024-06-06
Payer: MEDICARE

## 2024-06-07 DIAGNOSIS — B35.1 TOENAIL FUNGUS: Primary | ICD-10-CM

## 2024-06-07 RX ORDER — CICLOPIROX 80 MG/ML
SOLUTION TOPICAL NIGHTLY
Qty: 6.6 ML | Refills: 2 | Status: SHIPPED | OUTPATIENT
Start: 2024-06-07

## 2024-06-17 ENCOUNTER — TELEPHONE (OUTPATIENT)
Dept: FAMILY MEDICINE CLINIC | Age: 69
End: 2024-06-17
Payer: MEDICARE

## 2024-06-27 ENCOUNTER — TELEPHONE (OUTPATIENT)
Dept: FAMILY MEDICINE CLINIC | Age: 69
End: 2024-06-27
Payer: MEDICARE

## 2024-08-13 ENCOUNTER — OFFICE VISIT (OUTPATIENT)
Dept: FAMILY MEDICINE CLINIC | Age: 69
End: 2024-08-13
Payer: MEDICARE

## 2024-08-13 VITALS
SYSTOLIC BLOOD PRESSURE: 140 MMHG | BODY MASS INDEX: 50.02 KG/M2 | HEIGHT: 64 IN | TEMPERATURE: 97.6 F | WEIGHT: 293 LBS | DIASTOLIC BLOOD PRESSURE: 77 MMHG | HEART RATE: 89 BPM

## 2024-08-13 DIAGNOSIS — E66.01 OBESITY, MORBID, BMI 50 OR HIGHER: ICD-10-CM

## 2024-08-13 DIAGNOSIS — R05.8 OTHER COUGH: ICD-10-CM

## 2024-08-13 DIAGNOSIS — R53.83 OTHER FATIGUE: Primary | ICD-10-CM

## 2024-08-13 PROCEDURE — 1160F RVW MEDS BY RX/DR IN RCRD: CPT | Performed by: NURSE PRACTITIONER

## 2024-08-13 PROCEDURE — 3078F DIAST BP <80 MM HG: CPT | Performed by: NURSE PRACTITIONER

## 2024-08-13 PROCEDURE — 1159F MED LIST DOCD IN RCRD: CPT | Performed by: NURSE PRACTITIONER

## 2024-08-13 PROCEDURE — 3077F SYST BP >= 140 MM HG: CPT | Performed by: NURSE PRACTITIONER

## 2024-08-13 PROCEDURE — 99214 OFFICE O/P EST MOD 30 MIN: CPT | Performed by: NURSE PRACTITIONER

## 2024-08-20 ENCOUNTER — LAB (OUTPATIENT)
Dept: LAB | Facility: HOSPITAL | Age: 69
End: 2024-08-20
Payer: MEDICARE

## 2024-08-20 ENCOUNTER — OFFICE VISIT (OUTPATIENT)
Dept: FAMILY MEDICINE CLINIC | Age: 69
End: 2024-08-20
Payer: MEDICARE

## 2024-08-20 VITALS
TEMPERATURE: 97.6 F | SYSTOLIC BLOOD PRESSURE: 108 MMHG | DIASTOLIC BLOOD PRESSURE: 78 MMHG | WEIGHT: 293 LBS | HEART RATE: 82 BPM | BODY MASS INDEX: 50.02 KG/M2 | HEIGHT: 64 IN

## 2024-08-20 DIAGNOSIS — Z01.818 PRE-OPERATIVE CLEARANCE: ICD-10-CM

## 2024-08-20 DIAGNOSIS — R73.03 PRE-DIABETES: ICD-10-CM

## 2024-08-20 DIAGNOSIS — E66.01 OBESITY, MORBID, BMI 50 OR HIGHER: Primary | ICD-10-CM

## 2024-08-20 DIAGNOSIS — I10 ESSENTIAL (PRIMARY) HYPERTENSION: ICD-10-CM

## 2024-08-20 DIAGNOSIS — E66.01 OBESITY, MORBID, BMI 50 OR HIGHER: ICD-10-CM

## 2024-08-20 LAB
ALBUMIN SERPL-MCNC: 4 G/DL (ref 3.5–5.2)
ALBUMIN/GLOB SERPL: 1.3 G/DL
ALP SERPL-CCNC: 71 U/L (ref 39–117)
ALT SERPL W P-5'-P-CCNC: 16 U/L (ref 1–33)
ANION GAP SERPL CALCULATED.3IONS-SCNC: 11.9 MMOL/L (ref 5–15)
AST SERPL-CCNC: 20 U/L (ref 1–32)
BILIRUB SERPL-MCNC: 0.3 MG/DL (ref 0–1.2)
BUN SERPL-MCNC: 13 MG/DL (ref 8–23)
BUN/CREAT SERPL: 11.1 (ref 7–25)
CALCIUM SPEC-SCNC: 9.7 MG/DL (ref 8.6–10.5)
CHLORIDE SERPL-SCNC: 104 MMOL/L (ref 98–107)
CHOLEST SERPL-MCNC: 235 MG/DL (ref 0–200)
CO2 SERPL-SCNC: 22.1 MMOL/L (ref 22–29)
CREAT SERPL-MCNC: 1.17 MG/DL (ref 0.57–1)
EGFRCR SERPLBLD CKD-EPI 2021: 50.6 ML/MIN/1.73
GLOBULIN UR ELPH-MCNC: 3.2 GM/DL
GLUCOSE SERPL-MCNC: 114 MG/DL (ref 65–99)
HBA1C MFR BLD: 6.1 % (ref 4.8–5.6)
HDLC SERPL-MCNC: 57 MG/DL (ref 40–60)
LDLC SERPL CALC-MCNC: 163 MG/DL (ref 0–100)
LDLC/HDLC SERPL: 2.82 {RATIO}
POTASSIUM SERPL-SCNC: 4.5 MMOL/L (ref 3.5–5.2)
PROT SERPL-MCNC: 7.2 G/DL (ref 6–8.5)
SODIUM SERPL-SCNC: 138 MMOL/L (ref 136–145)
TRIGL SERPL-MCNC: 85 MG/DL (ref 0–150)
TSH SERPL DL<=0.05 MIU/L-ACNC: 1.21 UIU/ML (ref 0.27–4.2)
VLDLC SERPL-MCNC: 15 MG/DL (ref 5–40)

## 2024-08-20 PROCEDURE — 80053 COMPREHEN METABOLIC PANEL: CPT | Performed by: NURSE PRACTITIONER

## 2024-08-20 PROCEDURE — 83036 HEMOGLOBIN GLYCOSYLATED A1C: CPT

## 2024-08-20 PROCEDURE — 3074F SYST BP LT 130 MM HG: CPT | Performed by: NURSE PRACTITIONER

## 2024-08-20 PROCEDURE — 36415 COLL VENOUS BLD VENIPUNCTURE: CPT | Performed by: NURSE PRACTITIONER

## 2024-08-20 PROCEDURE — 99214 OFFICE O/P EST MOD 30 MIN: CPT | Performed by: NURSE PRACTITIONER

## 2024-08-20 PROCEDURE — 83013 H PYLORI (C-13) BREATH: CPT

## 2024-08-20 PROCEDURE — 3078F DIAST BP <80 MM HG: CPT | Performed by: NURSE PRACTITIONER

## 2024-08-20 PROCEDURE — 80061 LIPID PANEL: CPT | Performed by: NURSE PRACTITIONER

## 2024-08-20 PROCEDURE — 84443 ASSAY THYROID STIM HORMONE: CPT | Performed by: NURSE PRACTITIONER

## 2024-08-22 LAB — UREA BREATH TEST QL: NEGATIVE

## 2024-08-27 ENCOUNTER — TELEPHONE (OUTPATIENT)
Dept: FAMILY MEDICINE CLINIC | Age: 69
End: 2024-08-27
Payer: MEDICARE

## 2024-09-03 ENCOUNTER — OFFICE VISIT (OUTPATIENT)
Dept: CARDIOLOGY | Facility: CLINIC | Age: 69
End: 2024-09-03
Payer: MEDICARE

## 2024-09-03 VITALS
HEART RATE: 82 BPM | BODY MASS INDEX: 50.02 KG/M2 | DIASTOLIC BLOOD PRESSURE: 88 MMHG | SYSTOLIC BLOOD PRESSURE: 130 MMHG | HEIGHT: 64 IN | WEIGHT: 293 LBS

## 2024-09-03 DIAGNOSIS — I10 ESSENTIAL (PRIMARY) HYPERTENSION: Primary | ICD-10-CM

## 2024-09-03 DIAGNOSIS — R06.09 DYSPNEA ON EXERTION: ICD-10-CM

## 2024-09-03 DIAGNOSIS — Z01.818 PRE-OPERATIVE CLEARANCE: ICD-10-CM

## 2024-09-03 DIAGNOSIS — E78.2 MIXED HYPERLIPIDEMIA: ICD-10-CM

## 2024-09-04 ENCOUNTER — OFFICE VISIT (OUTPATIENT)
Dept: FAMILY MEDICINE CLINIC | Age: 69
End: 2024-09-04
Payer: MEDICARE

## 2024-09-04 ENCOUNTER — HOSPITAL ENCOUNTER (OUTPATIENT)
Dept: GENERAL RADIOLOGY | Facility: HOSPITAL | Age: 69
Discharge: HOME | End: 2024-09-04
Payer: MEDICARE

## 2024-09-04 ENCOUNTER — LAB (OUTPATIENT)
Dept: LAB | Facility: HOSPITAL | Age: 69
End: 2024-09-04
Payer: MEDICARE

## 2024-09-04 VITALS
BODY MASS INDEX: 50.02 KG/M2 | HEIGHT: 64 IN | HEART RATE: 81 BPM | DIASTOLIC BLOOD PRESSURE: 77 MMHG | TEMPERATURE: 98.1 F | WEIGHT: 293 LBS | SYSTOLIC BLOOD PRESSURE: 164 MMHG

## 2024-09-04 DIAGNOSIS — N28.9 ABNORMAL RENAL FUNCTION: Primary | ICD-10-CM

## 2024-09-04 DIAGNOSIS — N28.9 ABNORMAL RENAL FUNCTION: ICD-10-CM

## 2024-09-04 DIAGNOSIS — R73.03 PRE-DIABETES: ICD-10-CM

## 2024-09-04 DIAGNOSIS — F41.9 ANXIETY: ICD-10-CM

## 2024-09-04 DIAGNOSIS — R05.8 OTHER COUGH: ICD-10-CM

## 2024-09-04 DIAGNOSIS — E78.00 HIGH CHOLESTEROL: ICD-10-CM

## 2024-09-04 LAB
ANION GAP SERPL CALCULATED.3IONS-SCNC: 10.8 MMOL/L (ref 5–15)
BASOPHILS # BLD AUTO: 0.05 10*3/MM3 (ref 0–0.2)
BASOPHILS NFR BLD AUTO: 0.5 % (ref 0–1.5)
BUN SERPL-MCNC: 16 MG/DL (ref 8–23)
BUN/CREAT SERPL: 14 (ref 7–25)
CALCIUM SPEC-SCNC: 10 MG/DL (ref 8.6–10.5)
CHLORIDE SERPL-SCNC: 104 MMOL/L (ref 98–107)
CO2 SERPL-SCNC: 27.2 MMOL/L (ref 22–29)
CREAT SERPL-MCNC: 1.14 MG/DL (ref 0.57–1)
DEPRECATED RDW RBC AUTO: 47.2 FL (ref 37–54)
EGFRCR SERPLBLD CKD-EPI 2021: 52.2 ML/MIN/1.73
EOSINOPHIL # BLD AUTO: 0.13 10*3/MM3 (ref 0–0.4)
EOSINOPHIL NFR BLD AUTO: 1.3 % (ref 0.3–6.2)
ERYTHROCYTE [DISTWIDTH] IN BLOOD BY AUTOMATED COUNT: 13.2 % (ref 12.3–15.4)
GLUCOSE SERPL-MCNC: 104 MG/DL (ref 65–99)
HCT VFR BLD AUTO: 44.6 % (ref 34–46.6)
HGB BLD-MCNC: 13.8 G/DL (ref 12–15.9)
IMM GRANULOCYTES # BLD AUTO: 0.03 10*3/MM3 (ref 0–0.05)
IMM GRANULOCYTES NFR BLD AUTO: 0.3 % (ref 0–0.5)
LYMPHOCYTES # BLD AUTO: 2.05 10*3/MM3 (ref 0.7–3.1)
LYMPHOCYTES NFR BLD AUTO: 19.7 % (ref 19.6–45.3)
MCH RBC QN AUTO: 29.6 PG (ref 26.6–33)
MCHC RBC AUTO-ENTMCNC: 30.9 G/DL (ref 31.5–35.7)
MCV RBC AUTO: 95.5 FL (ref 79–97)
MONOCYTES # BLD AUTO: 0.79 10*3/MM3 (ref 0.1–0.9)
MONOCYTES NFR BLD AUTO: 7.6 % (ref 5–12)
NEUTROPHILS NFR BLD AUTO: 7.35 10*3/MM3 (ref 1.7–7)
NEUTROPHILS NFR BLD AUTO: 70.6 % (ref 42.7–76)
PLATELET # BLD AUTO: 347 10*3/MM3 (ref 140–450)
PMV BLD AUTO: 10.5 FL (ref 6–12)
POTASSIUM SERPL-SCNC: 4.2 MMOL/L (ref 3.5–5.2)
RBC # BLD AUTO: 4.67 10*6/MM3 (ref 3.77–5.28)
SODIUM SERPL-SCNC: 142 MMOL/L (ref 136–145)
WBC NRBC COR # BLD AUTO: 10.4 10*3/MM3 (ref 3.4–10.8)

## 2024-09-04 PROCEDURE — 1160F RVW MEDS BY RX/DR IN RCRD: CPT | Performed by: NURSE PRACTITIONER

## 2024-09-04 PROCEDURE — 80048 BASIC METABOLIC PNL TOTAL CA: CPT

## 2024-09-04 PROCEDURE — 36415 COLL VENOUS BLD VENIPUNCTURE: CPT

## 2024-09-04 PROCEDURE — 3078F DIAST BP <80 MM HG: CPT | Performed by: NURSE PRACTITIONER

## 2024-09-04 PROCEDURE — 3077F SYST BP >= 140 MM HG: CPT | Performed by: NURSE PRACTITIONER

## 2024-09-04 PROCEDURE — 1159F MED LIST DOCD IN RCRD: CPT | Performed by: NURSE PRACTITIONER

## 2024-09-04 PROCEDURE — 85025 COMPLETE CBC W/AUTO DIFF WBC: CPT

## 2024-09-04 PROCEDURE — 71046 X-RAY EXAM CHEST 2 VIEWS: CPT

## 2024-09-04 PROCEDURE — 99214 OFFICE O/P EST MOD 30 MIN: CPT | Performed by: NURSE PRACTITIONER

## 2024-09-04 RX ORDER — CEFDINIR 300 MG/1
300 CAPSULE ORAL 2 TIMES DAILY
Qty: 20 CAPSULE | Refills: 0 | Status: SHIPPED | OUTPATIENT
Start: 2024-09-04

## 2024-09-05 DIAGNOSIS — R93.89 ABNORMAL CHEST X-RAY: Primary | ICD-10-CM

## 2024-09-05 DIAGNOSIS — R05.3 CHRONIC COUGH: ICD-10-CM

## 2024-09-23 ENCOUNTER — TELEPHONE (OUTPATIENT)
Dept: FAMILY MEDICINE CLINIC | Age: 69
End: 2024-09-23
Payer: MEDICARE

## 2024-10-18 ENCOUNTER — TELEPHONE (OUTPATIENT)
Dept: FAMILY MEDICINE CLINIC | Age: 69
End: 2024-10-18
Payer: MEDICARE

## 2024-10-18 DIAGNOSIS — Z12.31 ENCOUNTER FOR SCREENING MAMMOGRAM FOR MALIGNANT NEOPLASM OF BREAST: Primary | ICD-10-CM

## 2024-11-12 ENCOUNTER — OFFICE VISIT (OUTPATIENT)
Dept: FAMILY MEDICINE CLINIC | Age: 69
End: 2024-11-12
Payer: MEDICARE

## 2024-11-12 VITALS
DIASTOLIC BLOOD PRESSURE: 82 MMHG | SYSTOLIC BLOOD PRESSURE: 153 MMHG | WEIGHT: 292.8 LBS | HEIGHT: 64 IN | HEART RATE: 79 BPM | BODY MASS INDEX: 49.99 KG/M2 | TEMPERATURE: 97.8 F

## 2024-11-12 DIAGNOSIS — R41.3 MEMORY LOSS: ICD-10-CM

## 2024-11-12 DIAGNOSIS — K08.89 PAIN, DENTAL: ICD-10-CM

## 2024-11-12 DIAGNOSIS — I10 ESSENTIAL (PRIMARY) HYPERTENSION: ICD-10-CM

## 2024-11-12 DIAGNOSIS — E66.01 OBESITY, MORBID, BMI 50 OR HIGHER: ICD-10-CM

## 2024-11-12 DIAGNOSIS — R73.03 PRE-DIABETES: Primary | ICD-10-CM

## 2024-11-12 PROCEDURE — 1160F RVW MEDS BY RX/DR IN RCRD: CPT | Performed by: NURSE PRACTITIONER

## 2024-11-12 PROCEDURE — 99214 OFFICE O/P EST MOD 30 MIN: CPT | Performed by: NURSE PRACTITIONER

## 2024-11-12 PROCEDURE — 3077F SYST BP >= 140 MM HG: CPT | Performed by: NURSE PRACTITIONER

## 2024-11-12 PROCEDURE — 3079F DIAST BP 80-89 MM HG: CPT | Performed by: NURSE PRACTITIONER

## 2024-11-12 PROCEDURE — 1159F MED LIST DOCD IN RCRD: CPT | Performed by: NURSE PRACTITIONER

## 2024-11-12 NOTE — ASSESSMENT & PLAN NOTE
Advised to recheck BP, declines today, reviewed last lab; make sure to recheck BP and take rx's daily

## 2024-11-12 NOTE — ASSESSMENT & PLAN NOTE
Score was 22 out of 30 on MOCA, advised labs and referral to neurology, declines getting her labs today, advised to return for labs

## 2024-11-12 NOTE — PROGRESS NOTES
Chief Complaint  Weight Loss (She wants to discuss any other options. She wasn't able to get surgery. )    Subjective          Blanca Giles presents to [facility]    History of Present Illness  History of obesity  Wanting weight loss surgery but having to go through the steps for approval, has been frustrating.      Memory loss:  Concerns for 3 months  Daughter has concerns and wanted her checked.     Has dental issues, is on ATB amoxil and Ibuprofen 800, was also given Omeprazole by [facility], but not taking    Has appt this Friday with Dr woodward [facility]     PAST MEDICAL HISTORY changes since 2024:          Summer 2020 covid +        GYNECOLOGICAL HISTORY:    Menopause at age 50.    Hospitalizations: syncope, admitted once on          PREVENTIVE HEALTH MAINTENANCE            COLORECTAL CANCER SCREENING: declines colorectal cancer screening, understands reason for testing       Surgical History:         : X 1;     Tubal Ligation     Breast Reduction     Gastric Banding; Procedures: Prior gynecologic procedures include laporoscopy to remove scarring from BTL.      Joint Replacement: right knee; ; sees Dr Garcia     Procedures: gastric band port repositioning 19         Family History:        Father:  at age 60; Cause of death was lung cancer     Mother:  at 82 Cause of death was COVID;  Arrhythmia; Hypertension     Brother(s):  1 brother Hypertension;  Type 2 Diabetes     Sister(s): 1  at 69 ;  Hypertension;  COVID 19     Son(s): 1 son(s) total; 1      Daughter(s): 3 daughter(s) total     Grandson: 17 year old and has had gastric sleeve surgery      Social History:        Occupation: Retired (Prior occupation: [email or web address])     Marital Status:      Children: 4 children          Past Medical History:   Diagnosis Date    Abnormal weight gain     Acne rosacea     Anxiety disorder, unspecified     Anxiety  with depression     Central obesity     Cough     COVID-19     Dizziness     Essential (primary) hypertension     GERD (gastroesophageal reflux disease)     High risk medication use     Hip pain     Hypo-osmolality and hyponatremia     Insomnia     Localized swelling, mass and lump, head     Myalgia, unspecified site     Nausea with vomiting, unspecified     OA (osteoarthritis) of knee     Other long term (current) drug therapy     Other obesity     Pain in unspecified joint     Primary insomnia     Rash and other nonspecific skin eruption     Syncope     admitted once on      Syncope and collapse     Unilateral primary osteoarthritis, right knee        No Known Allergies     Past Surgical History:   Procedure Laterality Date     SECTION      COLONOSCOPY      GASTRIC BAND ADJUSTMENT  2019    gastric band port repositioning 19     GASTRIC BANDING      LAPAROSCOPY WITH LASER      Prior gynecologic procedures include laporoscopy to remove scarring from BTL.      REDUCTION MAMMAPLASTY      REPLACEMENT TOTAL KNEE Right 2016    TUBAL ABDOMINAL LIGATION          Social History     Tobacco Use    Smoking status: Never     Passive exposure: Past    Smokeless tobacco: Never   Substance Use Topics    Alcohol use: Yes     Comment:  When she drinks, the average quantity of alcohol is 1.   She typically consumes white wine.         Family History   Problem Relation Age of Onset    Other Mother         COVID19; CAUSE OF DEATH    Arrhythmia Mother     Hypertension Mother     Lung cancer Father         CAUSE OF DEATH    Hypertension Sister     Other Sister         COVID19    Hypertension Brother     Diabetes type II Brother         Health Maintenance Due   Topic Date Due    TDAP/TD VACCINES (1 - Tdap) Never done    DXA SCAN  10/03/2021    ANNUAL WELLNESS VISIT  2024    COLORECTAL CANCER SCREENING  2025        Current Outpatient Medications on File Prior to Visit   Medication Sig     "amLODIPine-benazepril (LOTREL 5-20) 5-20 MG per capsule Take 1 capsule by mouth Daily.    amoxicillin (AMOXIL) 875 MG tablet Take 1 tablet by mouth 2 (Two) Times a Day for 10 days.    buPROPion XL (WELLBUTRIN XL) 300 MG 24 hr tablet Take 1 tablet by mouth Daily.    ciclopirox (PENLAC) 8 % solution Apply to affected toenail(s) & adjacent skin once daily with weekly nail trimming and debridement. Remove with alcohol every 7 days and restart.    escitalopram (LEXAPRO) 20 MG tablet TAKE 1 TABLET BY MOUTH DAILY    furosemide (LASIX) 20 MG tablet Take 1 tablet by mouth Daily As Needed (swelling).    ibuprofen (ADVIL,MOTRIN) 800 MG tablet Take 1 tablet by mouth Every 8 (Eight) Hours for 7 days.    omeprazole (priLOSEC) 20 MG capsule Take 1 capsule by mouth Daily.    [DISCONTINUED] cefdinir (OMNICEF) 300 MG capsule Take 1 capsule by mouth 2 (Two) Times a Day. (Patient not taking: Reported on 11/12/2024)     No current facility-administered medications on file prior to visit.       Immunization History   Administered Date(s) Administered    COVID-19 (DEMETRIUS) 03/10/2021    COVID-19 (PFIZER) Purple Cap Monovalent 11/10/2021    Influenza, Unspecified 10/05/2011       Review of Systems   Constitutional:  Negative for fatigue and fever.   HENT:  Positive for dental problem (on rx after going to [facility] for tooth pain).    Respiratory:  Negative for cough and shortness of breath.    Cardiovascular:  Negative for chest pain, palpitations and leg swelling.        Objective     Vitals:    11/12/24 1303   BP: 153/82   BP Location: Left arm   Patient Position: Sitting   Cuff Size: Large Adult   Pulse: 79   Temp: 97.8 °F (36.6 °C)   TempSrc: Oral   Weight: 133 kg (292 lb 12.8 oz)   Height: 162.6 cm (64\")            Physical Exam  Vitals reviewed.   Constitutional:       General: She is not in acute distress.     Appearance: Normal appearance. She is obese.   Neck:      Vascular: No carotid bruit.   Cardiovascular:      Rate and Rhythm: " Normal rate and regular rhythm.      Heart sounds: Normal heart sounds. No murmur heard.  Pulmonary:      Effort: Pulmonary effort is normal. No respiratory distress.      Breath sounds: Normal breath sounds.   Musculoskeletal:      Right lower leg: No edema.      Left lower leg: No edema.   Neurological:      Mental Status: She is alert.   Psychiatric:         Mood and Affect: Mood normal.         Behavior: Behavior normal.         Result Review :     The following data was reviewed by: MING Gonzalez on 11/12/2024:                       Assessment and Plan      Diagnoses and all orders for this visit:    1. Pre-diabetes (Primary)  Assessment & Plan:  Reviewed her previous labs       2. Obesity, morbid, BMI 50 or higher  Assessment & Plan:  Discussed her weight, follow up with surgeon as directed, work on diet and regular exercise.       3. Essential (primary) hypertension  Assessment & Plan:  Advised to recheck BP, declines today, reviewed last lab; make sure to recheck BP and take rx's daily     Orders:  -     Comprehensive metabolic panel; Future    4. Memory loss  Assessment & Plan:  Score was 22 out of 30 on MOCA, advised labs and referral to neurology, declines getting her labs today, advised to return for labs     Orders:  -     Vitamin B12; Future  -     Folate; Future  -     TSH Rfx On Abnormal To Free T4; Future  -     CBC w AUTO Differential; Future  -     Comprehensive metabolic panel; Future  -     RPR; Future  -     Ambulatory Referral to Neurology    5. Pain, dental  Assessment & Plan:  Keep her appt later this week with dentist, complete her Rx's                   I spent 26 minutes caring for Blanca on this date of service. This time includes time spent by me in the following activities:preparing for the visit, reviewing tests, obtaining and/or reviewing a separately obtained history, performing a medically appropriate examination and/or evaluation , counseling and educating the  patient/family/caregiver, ordering medications, tests, or procedures, referring and communicating with other health care professionals , and documenting information in the medical record    Follow Up     Return for follow up for labs .    Patient was given instructions and counseling regarding her condition or for health maintenance advice. Please see specific information pulled into the AVS if appropriate.

## 2024-11-27 ENCOUNTER — OFFICE VISIT (OUTPATIENT)
Dept: FAMILY MEDICINE CLINIC | Age: 69
End: 2024-11-27
Payer: MEDICARE

## 2024-11-27 ENCOUNTER — LAB (OUTPATIENT)
Dept: LAB | Facility: HOSPITAL | Age: 69
End: 2024-11-27
Payer: MEDICARE

## 2024-11-27 VITALS
BODY MASS INDEX: 49.85 KG/M2 | TEMPERATURE: 97.6 F | SYSTOLIC BLOOD PRESSURE: 161 MMHG | DIASTOLIC BLOOD PRESSURE: 88 MMHG | WEIGHT: 292 LBS | HEART RATE: 78 BPM | HEIGHT: 64 IN

## 2024-11-27 DIAGNOSIS — R41.3 MEMORY LOSS: ICD-10-CM

## 2024-11-27 DIAGNOSIS — R73.03 PRE-DIABETES: ICD-10-CM

## 2024-11-27 DIAGNOSIS — Z23 IMMUNIZATION DUE: ICD-10-CM

## 2024-11-27 DIAGNOSIS — I10 ESSENTIAL (PRIMARY) HYPERTENSION: ICD-10-CM

## 2024-11-27 DIAGNOSIS — E66.01 OBESITY, MORBID, BMI 50 OR HIGHER: ICD-10-CM

## 2024-11-27 DIAGNOSIS — Z78.0 POSTMENOPAUSAL: Primary | ICD-10-CM

## 2024-11-27 DIAGNOSIS — Z12.11 SCREENING FOR COLON CANCER: ICD-10-CM

## 2024-11-27 DIAGNOSIS — Z00.00 ROUTINE GENERAL MEDICAL EXAMINATION AT A HEALTH CARE FACILITY: ICD-10-CM

## 2024-11-27 LAB
ALBUMIN SERPL-MCNC: 3.9 G/DL (ref 3.5–5.2)
ALBUMIN/GLOB SERPL: 1.2 G/DL
ALP SERPL-CCNC: 67 U/L (ref 39–117)
ALT SERPL W P-5'-P-CCNC: 9 U/L (ref 1–33)
ANION GAP SERPL CALCULATED.3IONS-SCNC: 13.5 MMOL/L (ref 5–15)
AST SERPL-CCNC: 17 U/L (ref 1–32)
BASOPHILS # BLD AUTO: 0.04 10*3/MM3 (ref 0–0.2)
BASOPHILS NFR BLD AUTO: 0.4 % (ref 0–1.5)
BILIRUB SERPL-MCNC: 0.4 MG/DL (ref 0–1.2)
BUN SERPL-MCNC: 15 MG/DL (ref 8–23)
BUN/CREAT SERPL: 12.5 (ref 7–25)
CALCIUM SPEC-SCNC: 9.8 MG/DL (ref 8.6–10.5)
CHLORIDE SERPL-SCNC: 104 MMOL/L (ref 98–107)
CO2 SERPL-SCNC: 27.5 MMOL/L (ref 22–29)
CREAT SERPL-MCNC: 1.2 MG/DL (ref 0.57–1)
DEPRECATED RDW RBC AUTO: 49.3 FL (ref 37–54)
EGFRCR SERPLBLD CKD-EPI 2021: 48.8 ML/MIN/1.73
EOSINOPHIL # BLD AUTO: 0.08 10*3/MM3 (ref 0–0.4)
EOSINOPHIL NFR BLD AUTO: 0.8 % (ref 0.3–6.2)
ERYTHROCYTE [DISTWIDTH] IN BLOOD BY AUTOMATED COUNT: 14 % (ref 12.3–15.4)
FOLATE SERPL-MCNC: 8.83 NG/ML (ref 4.78–24.2)
GLOBULIN UR ELPH-MCNC: 3.2 GM/DL
GLUCOSE SERPL-MCNC: 108 MG/DL (ref 65–99)
HBA1C MFR BLD: 6 % (ref 4.8–5.6)
HCT VFR BLD AUTO: 45 % (ref 34–46.6)
HGB BLD-MCNC: 14.1 G/DL (ref 12–15.9)
IMM GRANULOCYTES # BLD AUTO: 0.02 10*3/MM3 (ref 0–0.05)
IMM GRANULOCYTES NFR BLD AUTO: 0.2 % (ref 0–0.5)
LYMPHOCYTES # BLD AUTO: 2.27 10*3/MM3 (ref 0.7–3.1)
LYMPHOCYTES NFR BLD AUTO: 23.4 % (ref 19.6–45.3)
MCH RBC QN AUTO: 29.6 PG (ref 26.6–33)
MCHC RBC AUTO-ENTMCNC: 31.3 G/DL (ref 31.5–35.7)
MCV RBC AUTO: 94.3 FL (ref 79–97)
MONOCYTES # BLD AUTO: 0.73 10*3/MM3 (ref 0.1–0.9)
MONOCYTES NFR BLD AUTO: 7.5 % (ref 5–12)
NEUTROPHILS NFR BLD AUTO: 6.56 10*3/MM3 (ref 1.7–7)
NEUTROPHILS NFR BLD AUTO: 67.7 % (ref 42.7–76)
PLATELET # BLD AUTO: 312 10*3/MM3 (ref 140–450)
PMV BLD AUTO: 11.4 FL (ref 6–12)
POTASSIUM SERPL-SCNC: 3.9 MMOL/L (ref 3.5–5.2)
PROT SERPL-MCNC: 7.1 G/DL (ref 6–8.5)
RBC # BLD AUTO: 4.77 10*6/MM3 (ref 3.77–5.28)
RPR SER QL: NORMAL
SODIUM SERPL-SCNC: 145 MMOL/L (ref 136–145)
TSH SERPL DL<=0.05 MIU/L-ACNC: 0.72 UIU/ML (ref 0.27–4.2)
VIT B12 BLD-MCNC: 381 PG/ML (ref 211–946)
WBC NRBC COR # BLD AUTO: 9.7 10*3/MM3 (ref 3.4–10.8)

## 2024-11-27 PROCEDURE — 1160F RVW MEDS BY RX/DR IN RCRD: CPT | Performed by: NURSE PRACTITIONER

## 2024-11-27 PROCEDURE — G0439 PPPS, SUBSEQ VISIT: HCPCS | Performed by: NURSE PRACTITIONER

## 2024-11-27 PROCEDURE — 82746 ASSAY OF FOLIC ACID SERUM: CPT

## 2024-11-27 PROCEDURE — 3079F DIAST BP 80-89 MM HG: CPT | Performed by: NURSE PRACTITIONER

## 2024-11-27 PROCEDURE — 80053 COMPREHEN METABOLIC PANEL: CPT

## 2024-11-27 PROCEDURE — 84443 ASSAY THYROID STIM HORMONE: CPT

## 2024-11-27 PROCEDURE — 3077F SYST BP >= 140 MM HG: CPT | Performed by: NURSE PRACTITIONER

## 2024-11-27 PROCEDURE — 36415 COLL VENOUS BLD VENIPUNCTURE: CPT

## 2024-11-27 PROCEDURE — 85025 COMPLETE CBC W/AUTO DIFF WBC: CPT

## 2024-11-27 PROCEDURE — 83036 HEMOGLOBIN GLYCOSYLATED A1C: CPT

## 2024-11-27 PROCEDURE — 1170F FXNL STATUS ASSESSED: CPT | Performed by: NURSE PRACTITIONER

## 2024-11-27 PROCEDURE — 82607 VITAMIN B-12: CPT

## 2024-11-27 PROCEDURE — 1159F MED LIST DOCD IN RCRD: CPT | Performed by: NURSE PRACTITIONER

## 2024-11-27 PROCEDURE — 86592 SYPHILIS TEST NON-TREP QUAL: CPT

## 2024-11-27 PROCEDURE — 96160 PT-FOCUSED HLTH RISK ASSMT: CPT | Performed by: NURSE PRACTITIONER

## 2024-11-27 NOTE — ASSESSMENT & PLAN NOTE
Repeat bp still up, advised to monitor BP at home. Continue current meds. Continue to modify diet and lifestyle. Sending her to the lab today.

## 2024-11-27 NOTE — ASSESSMENT & PLAN NOTE
Advise regular exercise, healthy eating, always wear seat belts. Living will discussed, booklet given, fall prevention discussed.  Immunizations discussed, after thanksgiving to get her flu and covid and needs pneum 20 vaccine, to check at her pharmacy on coverage of Tdap and shingrex vaccines.   To continue yearly optometry and dental exams.    Advised monthly BSE and keep her upcoming appt  1-2025 for her mammogram

## 2024-11-27 NOTE — PROGRESS NOTES
Subjective   The ABCs of the Annual Wellness Visit  Medicare Wellness Visit      Blanca Giles is a 69 y.o. patient who presents for a Medicare Wellness Visit.    Medicare wellness HPI  Exercises regularly: no   Eats healthy:tries   Last mammogram:scheduled for 1- (last one is epic is 2019)  Last DEXA:osteopenia 2019  Last pap smear:not sexually active and has gone to GYN for this in the past   BSE:yes   Wears seatbelts: yes   Living will:no, booklet given   Optometrist:goes to Dr Velasquez   Dentist:goes to Dr Powell   Tobacco:none, none  Alcohol intake:beer and bourdon   Drugs:TCH gummies, going to get a letter for this in [place]  Falls:none   Colonoscopy:  cologuard 1-3-2022 negative   Immunizations:thinks she had a Tetatnus at work but over 10 years     The following portions of the patient's history were reviewed and   updated as appropriate: allergies, current medications, past family history, past medical history, past social history, past surgical history, and problem list.    Compared to one year ago, the patient's physical   health is worse.due to her weight issues  Compared to one year ago, the patient's mental   health is worse.due to her weight issues     Recent Hospitalizations:  She was not admitted to the hospital during the last year.     Current Medical Providers:  Patient Care Team:  Sonia Perez APRN as PCP - General    Outpatient Medications Prior to Visit   Medication Sig Dispense Refill    amLODIPine-benazepril (LOTREL 5-20) 5-20 MG per capsule Take 1 capsule by mouth Daily. 90 capsule 1    buPROPion XL (WELLBUTRIN XL) 300 MG 24 hr tablet Take 1 tablet by mouth Daily. 90 tablet 1    ciclopirox (PENLAC) 8 % solution Apply to affected toenail(s) & adjacent skin once daily with weekly nail trimming and debridement. Remove with alcohol every 7 days and restart. 6.6 mL 2    escitalopram (LEXAPRO) 20 MG tablet TAKE 1 TABLET BY MOUTH DAILY 90 tablet 1    furosemide (LASIX) 20 MG  tablet Take 1 tablet by mouth Daily As Needed (swelling). 90 tablet 1    omeprazole (priLOSEC) 20 MG capsule Take 1 capsule by mouth Daily. 30 capsule 0     No facility-administered medications prior to visit.     No opioid medication identified on active medication list. I have reviewed chart for other potential  high risk medication/s and harmful drug interactions in the elderly.      Aspirin is not on active medication list.  Aspirin use is not indicated based on review of current medical condition/s. Risk of harm outweighs potential benefits.  .    Patient Active Problem List   Diagnosis    Anxiety with depression    Essential (primary) hypertension    Acne rosacea    Anxiety    Arthritis    Fatigue    Hip pain    History of total knee replacement, right    Seasonal allergic rhinitis    Status following gastric banding surgery for weight loss    Colon cancer screening    Central obesity    Immunization due    Encounter for screening mammogram for malignant neoplasm of breast    Routine general medical examination at a health care facility    Postmenopausal    Screening for colon cancer    Acute bilateral low back pain    Insomnia    High risk medication use    Cough    Screening for viral disease    Pre-diabetes    Screening mammogram for breast cancer    OA (osteoarthritis) of knee    Rash    Neck pain    Acute left-sided low back pain without sciatica    Folliculitis    Arthralgia    Numbness    TMJ (temporomandibular joint disorder)    Toenail fungus    Obesity, morbid, BMI 50 or higher    Pre-operative clearance    Abnormal renal function    High cholesterol    Memory loss    Pain, dental     Advance Care Planning Advance Directive is not on file.  ACP discussion was held with the patient during this visit. Patient does not have an advance directive, information provided.            Objective   Vitals:    11/27/24 1042 11/27/24 1121   BP: 167/98 161/88   BP Location: Right arm Right arm   Patient Position:  "Sitting Sitting   Cuff Size: Large Adult Large Adult   Pulse: 76 78   Temp: 97.6 °F (36.4 °C)    TempSrc: Oral    Weight: 132 kg (292 lb)    Height: 162.6 cm (64\")        Estimated body mass index is 50.12 kg/m² as calculated from the following:    Height as of this encounter: 162.6 cm (64\").    Weight as of this encounter: 132 kg (292 lb).            Does the patient have evidence of cognitive impairment? No                                                                                                Health  Risk Assessment    Smoking Status:  Social History     Tobacco Use   Smoking Status Never    Passive exposure: Past   Smokeless Tobacco Never     Alcohol Consumption:  Social History     Substance and Sexual Activity   Alcohol Use Yes    Comment:  When she drinks, the average quantity of alcohol is 1.   She typically consumes white wine.         Fall Risk Screen  [facility], and patient is at LOW risk for falls.Assessment completed on:2024    Depression Screening   Little interest or pleasure in doing things? Not at all   Feeling down, depressed, or hopeless? Not at all   PHQ-2 Total Score 0      Health Habits and Functional and Cognitive Screenin/27/2024    10:49 AM   Functional & Cognitive Status   Do you have difficulty preparing food and eating? No   Do you have difficulty bathing yourself, getting dressed or grooming yourself? No   Do you have difficulty using the toilet? No   Do you have difficulty moving around from place to place? Yes   Do you have trouble with steps or getting out of a bed or a chair? Yes   Current Diet Unhealthy Diet   Dental Exam Up to date   Eye Exam Up to date   Exercise (times per week) 0 times per week   Current Exercises Include No Regular Exercise   Do you need help using the phone?  No   Are you deaf or do you have serious difficulty hearing?  Yes   Do you need help to go to places out of walking distance? Yes   Do you need help " shopping? No   Do you need help preparing meals?  No   Do you need help with housework?  Yes   Do you need help with laundry? Yes   Do you need help taking your medications? Yes   Do you need help managing money? No   Do you ever drive or ride in a car without wearing a seat belt? No   Have you felt unusual stress, anger or loneliness in the last month? No   Who do you live with? Alone   If you need help, do you have trouble finding someone available to you? No   Have you been bothered in the last four weeks by sexual problems? No   Do you have difficulty concentrating, remembering or making decisions? Yes           Age-appropriate Screening Schedule:  Refer to the list below for future screening recommendations based on patient's age, sex and/or medical conditions. Orders for these recommended tests are listed in the plan section. The patient has been provided with a written plan.    Health Maintenance List  Health Maintenance   Topic Date Due    TDAP/TD VACCINES (1 - Tdap) Never done    ZOSTER VACCINE (1 of 2) Never done    DXA SCAN  10/03/2021    COVID-19 Vaccine (3 - 2024-25 season) 09/01/2024    COLORECTAL CANCER SCREENING  01/03/2025    INFLUENZA VACCINE  03/31/2025 (Originally 7/1/2024)    MAMMOGRAM  06/03/2025 (Originally 10/3/2021)    Pneumococcal Vaccine 65+ (1 of 1 - PCV) 11/12/2025 (Originally 10/12/2019)    LIPID PANEL  08/20/2025    BMI FOLLOWUP  08/20/2025    ANNUAL WELLNESS VISIT  11/27/2025    HEPATITIS C SCREENING  Completed                                                                                                                                                Gait and Balance Evaluation: Slow Tentative Pace  CMS Preventative Services Quick Reference  Risk Factors Identified During Encounter  Immunizations Discussed/Encouraged: Tdap, Influenza, and Prevnar 20 (Pneumococcal 20-valent conjugate)    The above risks/problems have been discussed with the patient.  Pertinent information has been  shared with the patient in the After Visit Summary.  An After Visit Summary and PPPS were made available to the patient.    Follow Up:   Next Medicare Wellness visit to be scheduled in 1 year.          Additional E&M Note during same encounter follows:  Patient has multiple medical problems which are significant and separately identifiable that require additional work above and beyond the Medicare Wellness Visit.      Chief Complaint  Medicare Wellness-subsequent    Blanca Giles is a 69 y.o. female who presents to [facility]     PAST MEDICAL HISTORY changes since 2024:          Summer 2020 covid +        GYNECOLOGICAL HISTORY:    Menopause at age 50.    Hospitalizations: syncope, admitted once on          PREVENTIVE HEALTH MAINTENANCE                  Surgical History:         : X 1;     Tubal Ligation     Breast Reduction     Gastric Banding; Procedures: Prior gynecologic procedures include laporoscopy to remove scarring from BTL.      Joint Replacement: right knee; ; sees Dr Garcia     Procedures: gastric band port repositioning 19         Family History:        Father:  at age 60; Cause of death was lung cancer     Mother:  at 82 Cause of death was COVID;  Arrhythmia; Hypertension     Brother(s):  1 brother Hypertension;  Type 2 Diabetes     Sister(s): 1  at 69 ;  Hypertension;  COVID 19     Son(s): 1 son(s) total; 1      Daughter(s): 3 daughter(s) total     Grandson: 17 year old and has had gastric sleeve surgery       Social History:        Occupation: Retired (Prior occupation: [employer])     Marital Status:      Children: 4 children                  Objective   Vital Signs:   Vitals:    24 1042 24 1121   BP: 167/98 161/88   BP Location: Right arm Right arm   Patient Position: Sitting Sitting   Cuff Size: Large Adult Large Adult   Pulse: 76 78   Temp: 97.6 °F (36.4 °C)    TempSrc:  "Oral    Weight: 132 kg (292 lb)    Height: 162.6 cm (64\")      Body mass index is 50.12 kg/m².    Wt Readings from Last 3 Encounters:   11/27/24 132 kg (292 lb)   11/12/24 133 kg (292 lb 12.8 oz)   09/04/24 134 kg (296 lb)     BP Readings from Last 3 Encounters:   11/27/24 161/88   11/12/24 153/82   09/04/24 164/77       Review of Systems   Constitutional:  Negative for activity change, appetite change, chills, fatigue and fever.   HENT:  Negative for congestion and hearing loss.    Eyes:  Negative for visual disturbance.   Respiratory:  Negative for cough, shortness of breath and wheezing.    Cardiovascular:  Negative for chest pain, palpitations and leg swelling.   Gastrointestinal:  Negative for abdominal pain, constipation, diarrhea, nausea and vomiting.   Musculoskeletal:  Positive for arthralgias (some she believes related to her weight issues). Negative for myalgias.   Skin:  Negative for rash.   Neurological:  Negative for dizziness, weakness and numbness.        Family think she may have memory issues    Psychiatric/Behavioral:  Negative for confusion, sleep disturbance and suicidal ideas.         Physical Exam  Vitals reviewed.   Constitutional:       General: She is not in acute distress.     Appearance: Normal appearance. She is well-developed. She is obese.   HENT:      Head: Normocephalic. Hair is normal.      Right Ear: Hearing, tympanic membrane, ear canal and external ear normal. No decreased hearing noted. No drainage.      Left Ear: Hearing, tympanic membrane, ear canal and external ear normal. No decreased hearing noted.      Nose: Nose normal. No nasal deformity.      Mouth/Throat:      Mouth: Mucous membranes are moist.   Eyes:      General: Lids are normal.      Extraocular Movements: Extraocular movements intact.      Conjunctiva/sclera: Conjunctivae normal.      Pupils: Pupils are equal, round, and reactive to light.   Neck:      Thyroid: No thyromegaly.      Vascular: No carotid bruit or " JVD.   Cardiovascular:      Rate and Rhythm: Normal rate and regular rhythm.      Pulses: Normal pulses.      Heart sounds: Normal heart sounds. No murmur heard.     No friction rub. No gallop.   Pulmonary:      Effort: Pulmonary effort is normal. No respiratory distress.      Breath sounds: Normal breath sounds. No wheezing.   Abdominal:      General: Bowel sounds are normal.      Palpations: Abdomen is soft. There is no mass.      Tenderness: There is no abdominal tenderness.   Musculoskeletal:         General: No tenderness or deformity. Normal range of motion.      Cervical back: Normal range of motion and neck supple.   Lymphadenopathy:      Cervical: No cervical adenopathy.   Skin:     General: Skin is warm and dry.      Findings: No erythema or rash.   Neurological:      Mental Status: She is alert and oriented to person, place, and time.      Motor: No abnormal muscle tone.      Gait: Gait abnormal (walks with a limp).      Deep Tendon Reflexes: Reflexes are normal and symmetric.   Psychiatric:         Mood and Affect: Mood normal.         Behavior: Behavior normal.         Thought Content: Thought content normal.         Judgment: Judgment normal.         The following data was reviewed by MING Gonzalez on 11/27/2024        Assessment & Plan   Diagnoses and all orders for this visit:    1. Postmenopausal (Primary)  Assessment & Plan:  Due dexa     Orders:  -     DEXA Bone Density Axial; Future    2. Routine general medical examination at a health care facility  Assessment & Plan:  Advise regular exercise, healthy eating, always wear seat belts. Living will discussed, booklet given, fall prevention discussed.  Immunizations discussed, after thanksgiving to get her flu and covid and needs pneum 20 vaccine, to check at her pharmacy on coverage of Tdap and shingrex vaccines.   To continue yearly optometry and dental exams.    Advised monthly BSE and keep her upcoming appt  1-2025 for her mammogram          3. Screening for colon cancer  Assessment & Plan:  Discussed checking cologuard, Needs to be after 1-3-2025 for a cologuard screen     Orders:  -     Cologuard - Stool, Per Rectum; Future    4. Memory loss  Assessment & Plan:  Lab orders are pending, advised to get today       5. Pre-diabetes  Assessment & Plan:  Discussed rx, rechecking her A1C     Orders:  -     Hemoglobin A1c; Future    6. Essential (primary) hypertension  Assessment & Plan:  Repeat bp still up, advised to monitor BP at home. Continue current meds. Continue to modify diet and lifestyle. Sending her to the lab today.         7. Immunization due  Assessment & Plan:  Wants to get flu and covid but wait until after thanksgiving       8. Obesity, morbid, BMI 50 or higher  Assessment & Plan:  She asked about getting a rx to [facility] to [facility] for GLP 1, discussed rx, would consider sending her to the pharmacy in [place] that does compounding, discussed exercise, diet and getting her labs first                     FOLLOW UP  Return for followup pending lab results.  Patient was given instructions and counseling regarding her condition or for health maintenance advice. Please see specific information pulled into the AVS if appropriate.     Sonia Perez, APRN  11/27/24  11:31 EST

## 2024-11-27 NOTE — ASSESSMENT & PLAN NOTE
She asked about getting a rx to take to [facility] for GLP 1, discussed rx, would consider sending her to the pharmacy in [place] that does compounding, discussed exercise, diet and getting her labs first

## 2024-12-11 ENCOUNTER — TELEPHONE (OUTPATIENT)
Dept: FAMILY MEDICINE CLINIC | Age: 69
End: 2024-12-11
Payer: MEDICARE

## 2024-12-11 DIAGNOSIS — K04.7 DENTAL INFECTION: Primary | ICD-10-CM

## 2024-12-11 RX ORDER — AMOXICILLIN 875 MG/1
875 TABLET, COATED ORAL 2 TIMES DAILY
Qty: 20 TABLET | Refills: 0 | Status: SHIPPED | OUTPATIENT
Start: 2024-12-11 | End: 2024-12-21

## 2024-12-11 NOTE — TELEPHONE ENCOUNTER
Pt said she went to urgent care on 11/11/24, they told her if was a cracked wisdom tooth and sent her to see Dr Montejo the dentist. She completed the antibiotic and it did get better and the swelling in her face went down, but now its back really hurting.   He referred her to a oral surgeon but to see them she would need to pay $ 169.00 for the consult and then for surgery she would have to come up with   $ 2500.00. She said she can't pay that.

## 2024-12-11 NOTE — TELEPHONE ENCOUNTER
I can send in that refill of amoxil, but needs to see the dentist if she can't go to the oral surgeon

## 2024-12-11 NOTE — TELEPHONE ENCOUNTER
Caller: Blanca Giles    Relationship: Self    Best call back number: [phone]     What medication are you requesting: AMOXICILLIN 875 MG     What are your current symptoms: TOOTH PAIN     How long have you been experiencing symptoms: WISDOM TOOTH PAIN DIAGNOSED BY URGENT CARE ON 11/11/24    Have you had these symptoms before:    [x] Yes  [] No    Have you been treated for these symptoms before:   [x] Yes  [] No    If a prescription is needed, what is your preferred pharmacy and phone number: [facility] - [place]     Additional notes:  CALLER STATED THAT SHE CAN NOT SEE ORAL SURGEON UNTIL FOR ANOTHER MONTH.   CALLER  STATED THAT SHE HAS SEEN AISHA GRADY SINCE BEING SEEN AT URGENT CARE FOR THIS ISSUE.  SHE IS HAVING MORE PAIN AND NOW SLEEP LOSS.

## 2024-12-11 NOTE — TELEPHONE ENCOUNTER
I sent the ATB to our pharmacy, she needs to see dentist and get pain rx from them and follow up with them, use heat and tylenol OTC

## 2024-12-11 NOTE — TELEPHONE ENCOUNTER
Pt informed. She said to go ahead and refill the amoxicillin and send in some tylenol #3 too if you can.

## 2024-12-11 NOTE — TELEPHONE ENCOUNTER
Talk to her, so she went to an urgent care and they treated her with the amoxil, did the dg her as an abscess/infection, and gave her amoxil and sent her to a oral surgeon and he can't see her until when? And she took a round of amoxil 11-11-24 and it was better?

## 2024-12-20 ENCOUNTER — TELEPHONE (OUTPATIENT)
Dept: FAMILY MEDICINE CLINIC | Age: 69
End: 2024-12-20
Payer: MEDICARE

## 2024-12-20 NOTE — TELEPHONE ENCOUNTER
Pt called and said she has oral surgery scheduled with Dr Ogden 01/03/25 for a tooth extraction.   She is needing clearance.  NNII Perez is out of the office until 01/02/25.   Pt said if she doesn't get the clearance back before that time they are going to postpone her surgery and she is having a lot of pain with that tooth.    She ask if the on call provider could sign her release?

## 2025-01-22 ENCOUNTER — OFFICE VISIT (OUTPATIENT)
Dept: FAMILY MEDICINE CLINIC | Age: 70
End: 2025-01-22
Payer: MEDICARE

## 2025-01-22 VITALS
DIASTOLIC BLOOD PRESSURE: 78 MMHG | WEIGHT: 290.2 LBS | HEIGHT: 64 IN | TEMPERATURE: 97.8 F | OXYGEN SATURATION: 96 % | BODY MASS INDEX: 49.54 KG/M2 | HEART RATE: 52 BPM | RESPIRATION RATE: 18 BRPM | SYSTOLIC BLOOD PRESSURE: 148 MMHG

## 2025-01-22 DIAGNOSIS — E65 CENTRAL OBESITY: ICD-10-CM

## 2025-01-22 DIAGNOSIS — I10 ESSENTIAL (PRIMARY) HYPERTENSION: Primary | ICD-10-CM

## 2025-01-22 DIAGNOSIS — R73.03 PRE-DIABETES: ICD-10-CM

## 2025-01-22 PROCEDURE — 1159F MED LIST DOCD IN RCRD: CPT | Performed by: NURSE PRACTITIONER

## 2025-01-22 PROCEDURE — 99214 OFFICE O/P EST MOD 30 MIN: CPT | Performed by: NURSE PRACTITIONER

## 2025-01-22 PROCEDURE — 3077F SYST BP >= 140 MM HG: CPT | Performed by: NURSE PRACTITIONER

## 2025-01-22 PROCEDURE — 3078F DIAST BP <80 MM HG: CPT | Performed by: NURSE PRACTITIONER

## 2025-01-22 PROCEDURE — 1160F RVW MEDS BY RX/DR IN RCRD: CPT | Performed by: NURSE PRACTITIONER

## 2025-01-22 NOTE — ASSESSMENT & PLAN NOTE
Repeat bp, slightly improved, to take both her rx's, work on diet , weight loss and regular exercise, monitor BP

## 2025-01-22 NOTE — ASSESSMENT & PLAN NOTE
Reviewed labs, risk vs benefit of GLP 1, to work on diet and regular exercise. Reviewed and signed consent for treatment for GLP, will send rx to the compounding pharmacy of the GLP 1  Will need to follow up in 3 months, sooner if needed. Will start with 0.25 and if tolerating can go up to 0.5 weekly   She will need to go to the pharmacy for the rx and work with her on injections

## 2025-01-22 NOTE — Clinical Note
Let pt know I sent the compounded ozempic rx to [facility] in [place] and they will call her when ready and she can go get, will show her how to do inj, can go up next month on the dose if she is doing okay on the starting dose of 0.25

## 2025-01-22 NOTE — PROGRESS NOTES
Chief Complaint  Hypertension and obesity and pre diabetes     Subjective          Blanca Olga Giles presents to [facility]    History of Present Illness  Hypertension:  Current medication: lotrel 5-20, lasix (takes when she will be at home) will take later today   Tolerating Medication: Yes  Labs:  Lab Results       Component                Value               Date                       GLUCOSE                  108 (H)             11/27/2024                 BUN                      15                  11/27/2024                 CREATININE               1.20 (H)            11/27/2024                 EGFRIFNONA               65                  01/31/2022                 EGFRIFAFRI               75                  01/31/2022                 BCR                      12.5                11/27/2024                 K                        3.9                 11/27/2024                 CO2                      27.5                11/27/2024                 CALCIUM                  9.8                 11/27/2024                 PROTENTOTREF             6.6                 01/31/2022                 ALBUMIN                  3.9                 11/27/2024                 LABIL2                   1.4                 01/31/2022                 AST                      17                  11/27/2024                 ALT                      9                   11/27/2024              Wants something to help her lose weight.   Would like to try GLP 1, she has no history of thyroid cancer or pancreatitis.  Pre Diabetes:  Current medication: none  At home BS ranges: not checking sugars   Lab Results       Component                Value               Date                       HGBA1C                   6.00 (H)            11/27/2024                  PAST MEDICAL HISTORY changes since 11-:          Summer 2020 covid +        GYNECOLOGICAL HISTORY:    Menopause at age 50.     Hospitalizations: syncope, admitted once on          PREVENTIVE HEALTH MAINTENANCE                  Surgical History:         : X 1;     Tubal Ligation     Breast Reduction     Gastric Banding; Procedures: Prior gynecologic procedures include laporoscopy to remove scarring from BTL.      Joint Replacement: right knee; ; sees Dr Garcia     Procedures: gastric band port repositioning 19         Family History:        Father:  at age 60; Cause of death was lung cancer     Mother:  at 82 Cause of death was COVID;  Arrhythmia; Hypertension     Brother(s):  1 brother Hypertension;  Type 2 Diabetes     Sister(s): 1  at 69 ;  Hypertension;  COVID 19     Son(s): 1 son(s) total; 1      Daughter(s): 3 daughter(s) total     Grandson: 17 year old and has had gastric sleeve surgery       Social History:        Occupation: Retired (Prior occupation: [employer])     Marital Status:      Children: 4 children                     Past Medical History:   Diagnosis Date    Abnormal weight gain     Acne rosacea     Anxiety disorder, unspecified     Anxiety with depression     Central obesity     Cough     COVID-19     Dizziness     Essential (primary) hypertension     GERD (gastroesophageal reflux disease)     High risk medication use     Hip pain     Hypo-osmolality and hyponatremia     Insomnia     Localized swelling, mass and lump, head     Myalgia, unspecified site     Nausea with vomiting, unspecified     OA (osteoarthritis) of knee     Other long term (current) drug therapy     Other obesity     Pain in unspecified joint     Primary insomnia     Rash and other nonspecific skin eruption     Syncope     admitted once on      Syncope and collapse     Unilateral primary osteoarthritis, right knee        No Known Allergies     Past Surgical History:   Procedure Laterality Date     SECTION      COLONOSCOPY      GASTRIC BAND ADJUSTMENT  2019     gastric band port repositioning 1-14-19     GASTRIC BANDING      LAPAROSCOPY WITH LASER      Prior gynecologic procedures include laporoscopy to remove scarring from BTL.      REDUCTION MAMMAPLASTY      REPLACEMENT TOTAL KNEE Right 08/2016    TUBAL ABDOMINAL LIGATION          Social History     Tobacco Use    Smoking status: Never     Passive exposure: Past    Smokeless tobacco: Never   Substance Use Topics    Alcohol use: Yes     Comment:  When she drinks, the average quantity of alcohol is 1.   She typically consumes white wine.         Family History   Problem Relation Age of Onset    Other Mother         COVID19; CAUSE OF DEATH    Arrhythmia Mother     Hypertension Mother     Lung cancer Father         CAUSE OF DEATH    Hypertension Sister     Other Sister         COVID19    Hypertension Brother     Diabetes type II Brother         Health Maintenance Due   Topic Date Due    TDAP/TD VACCINES (1 - Tdap) Never done    ZOSTER VACCINE (1 of 2) Never done    DXA SCAN  10/03/2021    COVID-19 Vaccine (3 - 2024-25 season) 09/01/2024    COLORECTAL CANCER SCREENING  01/03/2025        Current Outpatient Medications on File Prior to Visit   Medication Sig    amLODIPine-benazepril (LOTREL 5-20) 5-20 MG per capsule Take 1 capsule by mouth Daily.    buPROPion XL (WELLBUTRIN XL) 300 MG 24 hr tablet Take 1 tablet by mouth Daily.    ciclopirox (PENLAC) 8 % solution Apply to affected toenail(s) & adjacent skin once daily with weekly nail trimming and debridement. Remove with alcohol every 7 days and restart.    escitalopram (LEXAPRO) 20 MG tablet TAKE 1 TABLET BY MOUTH DAILY    furosemide (LASIX) 20 MG tablet Take 1 tablet by mouth Daily As Needed (swelling).    omeprazole (priLOSEC) 20 MG capsule Take 1 capsule by mouth Daily.     No current facility-administered medications on file prior to visit.       Immunization History   Administered Date(s) Administered    COVID-19 ([employer]) 03/10/2021    COVID-19 (PFIZER) Purple Cap  "Monovalent 11/10/2021    Influenza, Unspecified 10/05/2011       Review of Systems   Constitutional:  Negative for fatigue and fever.   HENT:  Positive for dental problem (improved).    Respiratory:  Negative for cough and shortness of breath.    Cardiovascular:  Negative for chest pain, palpitations and leg swelling.        Objective     Vitals:    01/22/25 1454 01/22/25 1514 01/22/25 1528   BP: (!) 154/102 152/90  Comment: manual 148/78  Comment: manual   BP Location: Left arm Left arm Left arm   Patient Position: Sitting Sitting Sitting   Cuff Size: Adult Large Adult Large Adult   Pulse: 52     Resp: 18     Temp: 97.8 °F (36.6 °C)     TempSrc: Oral     SpO2: 96%  Comment: room air     Weight: 132 kg (290 lb 3.2 oz)     Height: 162.6 cm (64.02\")              Physical Exam  Vitals reviewed.   Constitutional:       General: She is not in acute distress.     Appearance: Normal appearance. She is obese.   Neck:      Vascular: No carotid bruit.   Cardiovascular:      Rate and Rhythm: Normal rate and regular rhythm.      Heart sounds: Normal heart sounds. No murmur heard.  Pulmonary:      Effort: Pulmonary effort is normal. No respiratory distress.      Breath sounds: Normal breath sounds.   Musculoskeletal:      Right lower leg: No edema.      Left lower leg: No edema.   Neurological:      Mental Status: She is alert.   Psychiatric:         Mood and Affect: Mood normal.         Behavior: Behavior normal.         Result Review :     The following data was reviewed by: MING Gonzalez on 01/22/2025:                       Assessment and Plan      Diagnoses and all orders for this visit:    1. Essential (primary) hypertension (Primary)  Assessment & Plan:  Repeat bp, slightly improved, to take both her rx's, work on diet , weight loss and regular exercise, monitor BP      2. Pre-diabetes  Assessment & Plan:  Reviewed labs, risk vs benefit of GLP 1, to work on diet and regular exercise. Reviewed and signed " consent for treatment for GLP, will send rx to the compounding pharmacy of the GLP 1  Will need to follow up in 3 months, sooner if needed. Will start with 0.25 and if tolerating can go up to 0.5 weekly   She will need to go to the pharmacy for the rx and work with her on injections     Orders:  -     Semaglutide-Weight Management (Wegovy) 0.25 MG/0.5ML solution auto-injector; Inject 0.5 mL under the skin into the appropriate area as directed 1 (One) Time Per Week.  Dispense: 2 mL; Refill: 0    3. Central obesity  Assessment & Plan:  Discussed her weight, diet, regular exercise                   I  Follow Up     Return in about 3 months (around 4/22/2025).    Patient was given instructions and counseling regarding her condition or for health maintenance advice. Please see specific information pulled into the AVS if appropriate.

## 2025-01-23 ENCOUNTER — TELEPHONE (OUTPATIENT)
Dept: FAMILY MEDICINE CLINIC | Age: 70
End: 2025-01-23
Payer: MEDICARE

## 2025-01-23 RX ORDER — SEMAGLUTIDE 0.25 MG/.5ML
0.25 INJECTION, SOLUTION SUBCUTANEOUS WEEKLY
Qty: 2 ML | Refills: 0 | Status: SHIPPED | OUTPATIENT
Start: 2025-01-23

## 2025-01-23 NOTE — TELEPHONE ENCOUNTER
----- Message from Sonia Perez sent at 1/23/2025  5:04 PM EST -----  Let pt know I sent the compounded ozempic rx to [facility] in [place] and they will call her when ready and she can go get, will show her how to do inj, can go up next month on the dose if she is doing okay on the starting dose of 0.25

## 2025-01-28 ENCOUNTER — CLINICAL SUPPORT (OUTPATIENT)
Dept: FAMILY MEDICINE CLINIC | Age: 70
End: 2025-01-28
Payer: MEDICARE

## 2025-01-28 NOTE — PROGRESS NOTES
Pt at office for teaching on how to give the Wegovy 0.25 injection.Pt will come back next Tuesday to still get more teaching on how to give injection.

## 2025-02-06 ENCOUNTER — TELEPHONE (OUTPATIENT)
Dept: FAMILY MEDICINE CLINIC | Age: 70
End: 2025-02-06
Payer: MEDICARE

## 2025-02-06 DIAGNOSIS — R93.89 ABNORMAL CHEST X-RAY: Primary | ICD-10-CM

## 2025-02-06 NOTE — TELEPHONE ENCOUNTER
[facility]---PT REFUSED TO SCHEDULE WITH PULM. AISHA WANTS TO SEE IF SHE WILL DO A FOLLOW UP XRAY DUE TO ABNORMAL XRAY 09/04/24.

## 2025-02-07 NOTE — TELEPHONE ENCOUNTER
Pt informed.  She said okay, she will come in next week and do a chest xray.  Order pended for signature.

## 2025-02-25 ENCOUNTER — TELEPHONE (OUTPATIENT)
Dept: FAMILY MEDICINE CLINIC | Age: 70
End: 2025-02-25
Payer: MEDICARE

## 2025-02-26 ENCOUNTER — HOSPITAL ENCOUNTER (OUTPATIENT)
Dept: GENERAL RADIOLOGY | Facility: HOSPITAL | Age: 70
Discharge: HOME | End: 2025-02-26
Admitting: NURSE PRACTITIONER
Payer: MEDICARE

## 2025-02-26 DIAGNOSIS — R93.89 ABNORMAL CHEST X-RAY: ICD-10-CM

## 2025-02-26 PROCEDURE — 71046 X-RAY EXAM CHEST 2 VIEWS: CPT

## 2025-03-11 ENCOUNTER — OFFICE VISIT (OUTPATIENT)
Dept: FAMILY MEDICINE CLINIC | Age: 70
End: 2025-03-11
Payer: MEDICARE

## 2025-03-11 ENCOUNTER — LAB (OUTPATIENT)
Dept: LAB | Facility: HOSPITAL | Age: 70
End: 2025-03-11
Payer: MEDICARE

## 2025-03-11 VITALS
HEIGHT: 64 IN | TEMPERATURE: 98 F | HEART RATE: 78 BPM | SYSTOLIC BLOOD PRESSURE: 152 MMHG | BODY MASS INDEX: 47.97 KG/M2 | OXYGEN SATURATION: 97 % | WEIGHT: 281 LBS | DIASTOLIC BLOOD PRESSURE: 83 MMHG

## 2025-03-11 DIAGNOSIS — I10 ESSENTIAL (PRIMARY) HYPERTENSION: ICD-10-CM

## 2025-03-11 DIAGNOSIS — G47.00 INSOMNIA, UNSPECIFIED TYPE: ICD-10-CM

## 2025-03-11 DIAGNOSIS — R73.03 PRE-DIABETES: ICD-10-CM

## 2025-03-11 DIAGNOSIS — N28.9 ABNORMAL RENAL FUNCTION: ICD-10-CM

## 2025-03-11 DIAGNOSIS — I10 ESSENTIAL (PRIMARY) HYPERTENSION: Primary | ICD-10-CM

## 2025-03-11 DIAGNOSIS — Z79.899 HIGH RISK MEDICATION USE: ICD-10-CM

## 2025-03-11 LAB
ALBUMIN SERPL-MCNC: 3.9 G/DL (ref 3.5–5.2)
ALBUMIN/GLOB SERPL: 1.3 G/DL
ALP SERPL-CCNC: 69 U/L (ref 39–117)
ALT SERPL W P-5'-P-CCNC: 9 U/L (ref 1–33)
ANION GAP SERPL CALCULATED.3IONS-SCNC: 13.6 MMOL/L (ref 5–15)
AST SERPL-CCNC: 14 U/L (ref 1–32)
BILIRUB SERPL-MCNC: 0.4 MG/DL (ref 0–1.2)
BUN SERPL-MCNC: 10 MG/DL (ref 8–23)
BUN/CREAT SERPL: 8.3 (ref 7–25)
CALCIUM SPEC-SCNC: 9.5 MG/DL (ref 8.6–10.5)
CHLORIDE SERPL-SCNC: 104 MMOL/L (ref 98–107)
CHOLEST SERPL-MCNC: 247 MG/DL (ref 0–200)
CO2 SERPL-SCNC: 27.4 MMOL/L (ref 22–29)
CREAT SERPL-MCNC: 1.21 MG/DL (ref 0.57–1)
EGFRCR SERPLBLD CKD-EPI 2021: 48.3 ML/MIN/1.73
GLOBULIN UR ELPH-MCNC: 3.1 GM/DL
GLUCOSE SERPL-MCNC: 116 MG/DL (ref 65–99)
HBA1C MFR BLD: 6 % (ref 4.8–5.6)
HDLC SERPL-MCNC: 50 MG/DL (ref 40–60)
LDLC SERPL CALC-MCNC: 182 MG/DL (ref 0–100)
LDLC/HDLC SERPL: 3.58 {RATIO}
POTASSIUM SERPL-SCNC: 3.8 MMOL/L (ref 3.5–5.2)
PROT SERPL-MCNC: 7 G/DL (ref 6–8.5)
SODIUM SERPL-SCNC: 145 MMOL/L (ref 136–145)
TRIGL SERPL-MCNC: 89 MG/DL (ref 0–150)
VLDLC SERPL-MCNC: 15 MG/DL (ref 5–40)

## 2025-03-11 PROCEDURE — 80053 COMPREHEN METABOLIC PANEL: CPT

## 2025-03-11 PROCEDURE — 83036 HEMOGLOBIN GLYCOSYLATED A1C: CPT

## 2025-03-11 PROCEDURE — 3077F SYST BP >= 140 MM HG: CPT | Performed by: NURSE PRACTITIONER

## 2025-03-11 PROCEDURE — 3079F DIAST BP 80-89 MM HG: CPT | Performed by: NURSE PRACTITIONER

## 2025-03-11 PROCEDURE — 1160F RVW MEDS BY RX/DR IN RCRD: CPT | Performed by: NURSE PRACTITIONER

## 2025-03-11 PROCEDURE — 1159F MED LIST DOCD IN RCRD: CPT | Performed by: NURSE PRACTITIONER

## 2025-03-11 PROCEDURE — 80061 LIPID PANEL: CPT

## 2025-03-11 PROCEDURE — 36415 COLL VENOUS BLD VENIPUNCTURE: CPT

## 2025-03-11 RX ORDER — AMLODIPINE AND BENAZEPRIL HYDROCHLORIDE 5; 20 MG/1; MG/1
1 CAPSULE ORAL DAILY
Qty: 90 CAPSULE | Refills: 1 | Status: SHIPPED | OUTPATIENT
Start: 2025-03-11

## 2025-03-11 RX ORDER — FUROSEMIDE 20 MG/1
20 TABLET ORAL DAILY PRN
Qty: 90 TABLET | Refills: 1 | Status: SHIPPED | OUTPATIENT
Start: 2025-03-11

## 2025-03-11 NOTE — ASSESSMENT & PLAN NOTE
Rechecking labs, she will continue compounded GLP 1, will let me know when she needs refills, to work on her diet and regular exercise

## 2025-03-11 NOTE — PROGRESS NOTES
Chief Complaint  Prediabetes (Follow up since starting Ozempic ) and insomnia     Subjective          Blanca Olga Giles presents to [facility]    History of Present Illness  Pre Diabetes:  Has lost 9 pounds  Current medication: compounded semaglutide  Tolerating medication: Yes, only had trouble first day, then has done fine and does not need refills yet from the compounding pharmacy   Lab Results       Component                Value               Date                       HGBA1C                   6.00 (H)            11/27/2024              Anxiety/ Depression/ Insomnia  Current medication: THC gummies, but wants to try ambien again, tried trazodone, it did not help    Refills needed: [facility]    Hypertension:  Current medication: lotrel and lasix  Tolerating Medication: Yes  Checking BP at home and it is: not checking   Needs refills: Yes to [facility]   Labs:  Lab Results       Component                Value               Date                       GLUCOSE                  108 (H)             11/27/2024                 BUN                      15                  11/27/2024                 CREATININE               1.20 (H)            11/27/2024                 EGFRIFNONA               65                  01/31/2022                 EGFRIFAFRI               75                  01/31/2022                 BCR                      12.5                11/27/2024                 K                        3.9                 11/27/2024                 CO2                      27.5                11/27/2024                 CALCIUM                  9.8                 11/27/2024                 ALBUMIN                  3.9                 11/27/2024                 AST                      17                  11/27/2024                 ALT                      9                   11/27/2024                PAST MEDICAL HISTORY changes since 1-:          Summer 2020 covid +         GYNECOLOGICAL HISTORY:    Menopause at age 50.    Hospitalizations: syncope, admitted once on          PREVENTIVE HEALTH MAINTENANCE                  Surgical History:         : X 1;     Tubal Ligation     Breast Reduction     Gastric Banding; Procedures: Prior gynecologic procedures include laporoscopy to remove scarring from BTL.      Joint Replacement: right knee; ; sees Dr Garcia     Procedures: gastric band port repositioning 19         Family History:        Father:  at age 60; Cause of death was lung cancer     Mother:  at 82 Cause of death was COVID;  Arrhythmia; Hypertension     Brother(s):  1 brother Hypertension;  Type 2 Diabetes     Sister(s): 1  at 69 ;  Hypertension;  COVID 19     Son(s): 1 son(s) total; 1      Daughter(s): 3 daughter(s) total     Grandson: 17 year old and has had gastric sleeve surgery       Social History:        Occupation: Retired (Prior occupation: [employer])     Marital Status:      Children: 4 children                  Past Medical History:   Diagnosis Date    Abnormal weight gain     Acne rosacea     Anxiety disorder, unspecified     Anxiety with depression     Central obesity     Cough     COVID-19     Dizziness     Essential (primary) hypertension     GERD (gastroesophageal reflux disease)     High risk medication use     Hip pain     Hypo-osmolality and hyponatremia     Insomnia     Localized swelling, mass and lump, head     Myalgia, unspecified site     Nausea with vomiting, unspecified     OA (osteoarthritis) of knee     Other long term (current) drug therapy     Other obesity     Pain in unspecified joint     Primary insomnia     Rash and other nonspecific skin eruption     Syncope     admitted once on      Syncope and collapse     Unilateral primary osteoarthritis, right knee        No Known Allergies     Past Surgical History:   Procedure Laterality Date     SECTION      COLONOSCOPY       GASTRIC BAND ADJUSTMENT  01/14/2019    gastric band port repositioning 1-14-19     GASTRIC BANDING      LAPAROSCOPY WITH LASER      Prior gynecologic procedures include laporoscopy to remove scarring from BTL.      REDUCTION MAMMAPLASTY      REPLACEMENT TOTAL KNEE Right 08/2016    TUBAL ABDOMINAL LIGATION          Social History     Tobacco Use    Smoking status: Never     Passive exposure: Past    Smokeless tobacco: Never   Substance Use Topics    Alcohol use: Yes     Comment:  When she drinks, the average quantity of alcohol is 1.   She typically consumes white wine.         Family History   Problem Relation Age of Onset    Other Mother         COVID19; CAUSE OF DEATH    Arrhythmia Mother     Hypertension Mother     Lung cancer Father         CAUSE OF DEATH    Hypertension Sister     Other Sister         COVID19    Hypertension Brother     Diabetes type II Brother         Health Maintenance Due   Topic Date Due    TDAP/TD VACCINES (1 - Tdap) Never done    ZOSTER VACCINE (1 of 2) Never done    DXA SCAN  10/03/2021    COVID-19 Vaccine (3 - 2024-25 season) 09/01/2024    COLORECTAL CANCER SCREENING  01/03/2025        Current Outpatient Medications on File Prior to Visit   Medication Sig    buPROPion XL (WELLBUTRIN XL) 300 MG 24 hr tablet Take 1 tablet by mouth Daily.    ciclopirox (PENLAC) 8 % solution Apply to affected toenail(s) & adjacent skin once daily with weekly nail trimming and debridement. Remove with alcohol every 7 days and restart.    escitalopram (LEXAPRO) 20 MG tablet TAKE 1 TABLET BY MOUTH DAILY    Semaglutide-Weight Management (Wegovy) 0.25 MG/0.5ML solution auto-injector Inject 0.5 mL under the skin into the appropriate area as directed 1 (One) Time Per Week.    [DISCONTINUED] amLODIPine-benazepril (LOTREL 5-20) 5-20 MG per capsule Take 1 capsule by mouth Daily.    [DISCONTINUED] furosemide (LASIX) 20 MG tablet Take 1 tablet by mouth Daily As Needed (swelling).    [DISCONTINUED] omeprazole  "(priLOSEC) 20 MG capsule Take 1 capsule by mouth Daily. (Patient not taking: Reported on 3/11/2025)     No current facility-administered medications on file prior to visit.       Immunization History   Administered Date(s) Administered    COVID-19 ([employer]) 03/10/2021    COVID-19 (PFIZER) Purple Cap Monovalent 11/10/2021    Influenza, Unspecified 10/05/2011       Review of Systems   Constitutional:  Negative for fatigue and fever.   Respiratory:  Negative for cough and shortness of breath.    Cardiovascular:  Negative for chest pain, palpitations and leg swelling.        Objective     Vitals:    03/11/25 1054   BP: 152/83   BP Location: Left arm   Patient Position: Sitting   Cuff Size: Adult   Pulse: 78   Temp: 98 °F (36.7 °C)   TempSrc: Oral   SpO2: 97%   Weight: 127 kg (281 lb)   Height: 162.6 cm (64.02\")            Physical Exam  Vitals reviewed.   Constitutional:       General: She is not in acute distress.     Appearance: Normal appearance.   Neck:      Vascular: No carotid bruit.   Cardiovascular:      Rate and Rhythm: Normal rate and regular rhythm.      Heart sounds: Normal heart sounds. No murmur heard.  Pulmonary:      Effort: Pulmonary effort is normal. No respiratory distress.      Breath sounds: Normal breath sounds.   Musculoskeletal:      Right lower leg: No edema.      Left lower leg: No edema.   Neurological:      Mental Status: She is alert.   Psychiatric:         Mood and Affect: Mood normal.         Behavior: Behavior normal.         Result Review :     The following data was reviewed by: MING Gonzalez on 03/11/2025:                       Assessment and Plan      Diagnoses and all orders for this visit:    1. Essential (primary) hypertension (Primary)  Assessment & Plan:  Repeat labs today, discussed getting adequate water daily, reviewed her last labs with her, make sure to take her rx daily     Orders:  -     amLODIPine-benazepril (LOTREL 5-20) 5-20 MG per capsule; Take 1 capsule " by mouth Daily.  Dispense: 90 capsule; Refill: 1  -     furosemide (LASIX) 20 MG tablet; Take 1 tablet by mouth Daily As Needed (swelling).  Dispense: 90 tablet; Refill: 1  -     Comprehensive Metabolic Panel; Future  -     Lipid Panel; Future    2. Pre-diabetes  Assessment & Plan:  Rechecking labs, she will continue compounded GLP 1, will let me know when she needs refills, to work on her diet and regular exercise     Orders:  -     Comprehensive Metabolic Panel; Future  -     Lipid Panel; Future  -     Hemoglobin A1c; Future    3. Abnormal renal function  Assessment & Plan:  Reviewed labs with pt, drink adequate water daily, rechecking labs       4. Insomnia, unspecified type  Assessment & Plan:  Discussed ambien, safety, she will stop using THC gummies and then will return to our lab for a UDS in the next few weeks       5. High risk medication use  Assessment & Plan:  Reviewed and discussed controlled consent form, stella and she will return and get a UDS in the next few weeks and then sign to consent form     Orders:  -     POC Medline 12 Panel Urine Drug Screen; Future                I spent >19 minutes caring for Blanca on this date of service. This time includes time spent by me in the following activities:preparing for the visit, reviewing tests, obtaining and/or reviewing a separately obtained history, performing a medically appropriate examination and/or evaluation , counseling and educating the patient/family/caregiver, ordering medications, tests, or procedures, and documenting information in the medical record    Follow Up     Return for followup pending lab results.    Patient was given instructions and counseling regarding her condition or for health maintenance advice. Please see specific information pulled into the AVS if appropriate.

## 2025-03-11 NOTE — ASSESSMENT & PLAN NOTE
Reviewed and discussed controlled consent form, stella and she will return and get a UDS in the next few weeks and then sign to consent form

## 2025-03-11 NOTE — ASSESSMENT & PLAN NOTE
Discussed ambien, safety, she will stop using THC gummies and then will return to our lab for a UDS in the next few weeks

## 2025-03-11 NOTE — ASSESSMENT & PLAN NOTE
Repeat labs today, discussed getting adequate water daily, reviewed her last labs with her, make sure to take her rx daily

## 2025-03-15 DIAGNOSIS — N28.9 ABNORMAL RENAL FUNCTION: Primary | ICD-10-CM

## 2025-04-01 ENCOUNTER — TELEPHONE (OUTPATIENT)
Dept: FAMILY MEDICINE CLINIC | Age: 70
End: 2025-04-01
Payer: MEDICARE

## 2025-04-01 NOTE — TELEPHONE ENCOUNTER
1st attempt- Inf pt of overdue imaging orders placed 11/27/24 and 10/18/24. Pt did not want to schedule appts at this time.

## 2025-04-02 ENCOUNTER — TELEPHONE (OUTPATIENT)
Dept: FAMILY MEDICINE CLINIC | Age: 70
End: 2025-04-02
Payer: MEDICARE

## 2025-04-02 NOTE — TELEPHONE ENCOUNTER
"  Caller: Blanca Giles    Relationship to patient: Self    Best call back number: [phone]     New or established patient?  [] New  [x] Established    Date of discharge: \"ONE DAY LAST WEEK\" PER PATIENT    Facility discharged from: [facility]    Diagnosis/Symptoms: PNEUMONIA    Length of stay (If applicable): ER VISIT ONLY    Additional Details: [facility] SCHEDULED HOSPITAL FOLLOW UP FOR 4.3.2025.    "

## 2025-04-08 ENCOUNTER — HOSPITAL ENCOUNTER (OUTPATIENT)
Dept: GENERAL RADIOLOGY | Facility: HOSPITAL | Age: 70
Discharge: HOME | End: 2025-04-08
Payer: MEDICARE

## 2025-04-08 ENCOUNTER — LAB (OUTPATIENT)
Dept: LAB | Facility: HOSPITAL | Age: 70
End: 2025-04-08
Payer: MEDICARE

## 2025-04-08 ENCOUNTER — OFFICE VISIT (OUTPATIENT)
Dept: FAMILY MEDICINE CLINIC | Age: 70
End: 2025-04-08
Payer: MEDICARE

## 2025-04-08 VITALS
OXYGEN SATURATION: 95 % | WEIGHT: 274.4 LBS | HEIGHT: 64 IN | TEMPERATURE: 98.6 F | HEART RATE: 83 BPM | BODY MASS INDEX: 46.85 KG/M2 | DIASTOLIC BLOOD PRESSURE: 94 MMHG | SYSTOLIC BLOOD PRESSURE: 152 MMHG

## 2025-04-08 DIAGNOSIS — R10.84 GENERALIZED ABDOMINAL PAIN: ICD-10-CM

## 2025-04-08 DIAGNOSIS — I10 ESSENTIAL (PRIMARY) HYPERTENSION: ICD-10-CM

## 2025-04-08 DIAGNOSIS — R73.03 PRE-DIABETES: ICD-10-CM

## 2025-04-08 DIAGNOSIS — Z12.11 COLON CANCER SCREENING: ICD-10-CM

## 2025-04-08 DIAGNOSIS — R93.89 ABNORMAL CHEST X-RAY: Primary | ICD-10-CM

## 2025-04-08 DIAGNOSIS — K59.09 OTHER CONSTIPATION: ICD-10-CM

## 2025-04-08 LAB
BASOPHILS # BLD AUTO: 0.02 10*3/MM3 (ref 0–0.2)
BASOPHILS NFR BLD AUTO: 0.2 % (ref 0–1.5)
DEPRECATED RDW RBC AUTO: 47.3 FL (ref 37–54)
EOSINOPHIL # BLD AUTO: 0.06 10*3/MM3 (ref 0–0.4)
EOSINOPHIL NFR BLD AUTO: 0.5 % (ref 0.3–6.2)
ERYTHROCYTE [DISTWIDTH] IN BLOOD BY AUTOMATED COUNT: 13.7 % (ref 12.3–15.4)
HCT VFR BLD AUTO: 48.8 % (ref 34–46.6)
HGB BLD-MCNC: 15.3 G/DL (ref 12–15.9)
IMM GRANULOCYTES # BLD AUTO: 0.03 10*3/MM3 (ref 0–0.05)
IMM GRANULOCYTES NFR BLD AUTO: 0.3 % (ref 0–0.5)
LYMPHOCYTES # BLD AUTO: 2.17 10*3/MM3 (ref 0.7–3.1)
LYMPHOCYTES NFR BLD AUTO: 18.3 % (ref 19.6–45.3)
MCH RBC QN AUTO: 29.1 PG (ref 26.6–33)
MCHC RBC AUTO-ENTMCNC: 31.4 G/DL (ref 31.5–35.7)
MCV RBC AUTO: 92.8 FL (ref 79–97)
MONOCYTES # BLD AUTO: 0.92 10*3/MM3 (ref 0.1–0.9)
MONOCYTES NFR BLD AUTO: 7.8 % (ref 5–12)
NEUTROPHILS NFR BLD AUTO: 72.9 % (ref 42.7–76)
NEUTROPHILS NFR BLD AUTO: 8.67 10*3/MM3 (ref 1.7–7)
PLATELET # BLD AUTO: 389 10*3/MM3 (ref 140–450)
PMV BLD AUTO: 11.4 FL (ref 6–12)
RBC # BLD AUTO: 5.26 10*6/MM3 (ref 3.77–5.28)
WBC NRBC COR # BLD AUTO: 11.87 10*3/MM3 (ref 3.4–10.8)

## 2025-04-08 PROCEDURE — 36415 COLL VENOUS BLD VENIPUNCTURE: CPT

## 2025-04-08 PROCEDURE — 74018 RADEX ABDOMEN 1 VIEW: CPT

## 2025-04-08 PROCEDURE — 85025 COMPLETE CBC W/AUTO DIFF WBC: CPT

## 2025-04-08 PROCEDURE — 80053 COMPREHEN METABOLIC PANEL: CPT

## 2025-04-08 PROCEDURE — 82150 ASSAY OF AMYLASE: CPT

## 2025-04-08 PROCEDURE — 84443 ASSAY THYROID STIM HORMONE: CPT | Performed by: NURSE PRACTITIONER

## 2025-04-08 RX ORDER — DOXYCYCLINE 100 MG/1
100 CAPSULE ORAL DAILY
COMMUNITY
Start: 2025-03-24

## 2025-04-08 NOTE — PROGRESS NOTES
Chief Complaint  Hospital Follow Up Visit ([place] 3/22/25 Pneumonia )    Subjective          Blanca Giles presents to [facility]    History of Present Illness  Follow up ER 3-22-25  CXR  Called after the ER visit and told her she had pneumonia and was prescribed doxy   Condition: improved     PORTABLE CHEST    HISTORY: General illness, cough and congestion.    COMPARISON: None.    FINDINGS: The heart is normal in size . The mediastinum is  unremarkable . There are left perihilar and left basilar opacities  concerning for pneumonia. There is no pneumothorax .    IMPRESSION:  Findings concerning for pneumonia. Recommend continued  radiographic follow-up.     ER note: Pt is a 69 y.o. female with history of hypertension who has had nasal congestion and cough for 2 weeks. No fever. A little short of breath. No history of lung disease. Non-smoker. She has not taken her blood pressure medicine over the last couple days because she has not felt well. No chest pain. She has had some scattered vomiting and constipation for 2 days but she is not eating much. Patient was afebrile. White count of 9, hemoglobin of 14 and platelets of 248. Chemistry unremarkable. Urinalysis negative. COVID and flu were negative. Chest x-ray did not show any acute findings. Patient was deemed stable for discharge home. She did receive clonidine here for her blood pressure with improvement. Recommended Coricidin HBP for nasal congestion and Flonase.    Constipation:   Started 2 weeks ago or longer  Is having nausea, is on a GLP 1  Has no appetite     Hypertension:  Current medication: lotrel 5/20 & lasix   Tolerating Medication: Yes, takes rx at night   Labs:  Lab Results       Component                Value               Date                       GLUCOSE                  116 (H)             03/11/2025                 BUN                      10                  03/11/2025                 CREATININE                1.21 (H)            2025                 EGFRIFNONA               65                  2022                 EGFRIFAFRI               75                  2022                 BCR                      8.3                 2025                 K                        3.8                 2025                 CO2                      27.4                2025                 CALCIUM                  9.5                 2025                 ALBUMIN                  3.9                 2025                 AST                      14                  2025                 ALT                      9                   2025                PAST MEDICAL HISTORY changes since 3-:          Summer 2020 covid +        GYNECOLOGICAL HISTORY:    Menopause at age 50.    Hospitalizations: syncope, admitted once on          PREVENTIVE HEALTH MAINTENANCE                  Surgical History:         : X 1;     Tubal Ligation     Breast Reduction     Gastric Banding; Procedures: Prior gynecologic procedures include laporoscopy to remove scarring from BTL.      Joint Replacement: right knee; ; sees Dr Garcia     Procedures: gastric band port repositioning 19         Family History:        Father:  at age 60; Cause of death was lung cancer     Mother:  at 82 Cause of death was COVID;  Arrhythmia; Hypertension     Brother(s):  1 brother Hypertension;  Type 2 Diabetes     Sister(s): 1  at 69 ;  Hypertension;  COVID 19     Son(s): 1 son(s) total; 1      Daughter(s): 3 daughter(s) total     Grandson: 17 year old and has had gastric sleeve surgery       Social History:        Occupation: Retired (Prior occupation: [employer])     Marital Status:      Children: 4 children                  Past Medical History:   Diagnosis Date    Abnormal weight gain     Acne rosacea     Anxiety disorder, unspecified     Anxiety  with depression     Central obesity     Cough     COVID-19     Dizziness     Essential (primary) hypertension     GERD (gastroesophageal reflux disease)     High risk medication use     Hip pain     Hypo-osmolality and hyponatremia     Insomnia     Localized swelling, mass and lump, head     Myalgia, unspecified site     Nausea with vomiting, unspecified     OA (osteoarthritis) of knee     Other long term (current) drug therapy     Other obesity     Pain in unspecified joint     Primary insomnia     Rash and other nonspecific skin eruption     Syncope     admitted once on      Syncope and collapse     Unilateral primary osteoarthritis, right knee        No Known Allergies     Past Surgical History:   Procedure Laterality Date     SECTION      COLONOSCOPY      GASTRIC BAND ADJUSTMENT  2019    gastric band port repositioning 19     GASTRIC BANDING      LAPAROSCOPY WITH LASER      Prior gynecologic procedures include laporoscopy to remove scarring from BTL.      REDUCTION MAMMAPLASTY      REPLACEMENT TOTAL KNEE Right 2016    TUBAL ABDOMINAL LIGATION          Social History     Tobacco Use    Smoking status: Never     Passive exposure: Past    Smokeless tobacco: Never   Substance Use Topics    Alcohol use: Yes     Comment:  When she drinks, the average quantity of alcohol is 1.   She typically consumes white wine.         Family History   Problem Relation Age of Onset    Other Mother         COVID19; CAUSE OF DEATH    Arrhythmia Mother     Hypertension Mother     Lung cancer Father         CAUSE OF DEATH    Hypertension Sister     Other Sister         COVID19    Hypertension Brother     Diabetes type II Brother         Health Maintenance Due   Topic Date Due    TDAP/TD VACCINES (1 - Tdap) Never done    ZOSTER VACCINE (1 of 2) Never done    DXA SCAN  10/03/2021    COVID-19 Vaccine (3 - 2024- season) 2024    COLORECTAL CANCER SCREENING  2025        Current Outpatient Medications  "on File Prior to Visit   Medication Sig    amLODIPine-benazepril (LOTREL 5-20) 5-20 MG per capsule Take 1 capsule by mouth Daily.    buPROPion XL (WELLBUTRIN XL) 300 MG 24 hr tablet Take 1 tablet by mouth Daily.    ciclopirox (PENLAC) 8 % solution Apply to affected toenail(s) & adjacent skin once daily with weekly nail trimming and debridement. Remove with alcohol every 7 days and restart.    doxycycline (VIBRAMYCIN) 100 MG capsule Take 1 capsule by mouth Daily.    escitalopram (LEXAPRO) 20 MG tablet TAKE 1 TABLET BY MOUTH DAILY    furosemide (LASIX) 20 MG tablet Take 1 tablet by mouth Daily As Needed (swelling).    Semaglutide-Weight Management (Wegovy) 0.25 MG/0.5ML solution auto-injector Inject 0.5 mL under the skin into the appropriate area as directed 1 (One) Time Per Week.     No current facility-administered medications on file prior to visit.       Immunization History   Administered Date(s) Administered    COVID-19 (Memobox) 03/10/2021    COVID-19 (PFIZER) Purple Cap Monovalent 11/10/2021    Influenza, Unspecified 10/05/2011       Review of Systems   Constitutional:  Negative for fever.   Respiratory:  Negative for cough and shortness of breath.    Cardiovascular:  Negative for chest pain.   Gastrointestinal:  Positive for abdominal pain, constipation and nausea.   Psychiatric/Behavioral:  Positive for stress (lots of issues).         Objective     Vitals:    04/08/25 1429 04/08/25 1453   BP: (!) 180/102 152/94   BP Location: Right arm Right arm   Patient Position: Sitting Sitting   Cuff Size: Large Adult Adult   Pulse: 83    Temp: 98.6 °F (37 °C)    TempSrc: Oral    SpO2: 95%    Weight: 124 kg (274 lb 6.4 oz)    Height: 162.6 cm (64.02\")             Physical Exam  Vitals reviewed.   Constitutional:       General: She is not in acute distress.     Appearance: Normal appearance.   Neck:      Vascular: No carotid bruit.   Cardiovascular:      Rate and Rhythm: Normal rate and regular rhythm.      Heart sounds: " Normal heart sounds. No murmur heard.  Pulmonary:      Effort: Pulmonary effort is normal. No respiratory distress.      Breath sounds: Normal breath sounds.   Abdominal:      Palpations: Abdomen is soft.      Tenderness: There is abdominal tenderness (mild generalized tenderness). There is no right CVA tenderness, left CVA tenderness, guarding or rebound.   Musculoskeletal:      Right lower leg: No edema.      Left lower leg: No edema.   Neurological:      Mental Status: She is alert.   Psychiatric:         Mood and Affect: Mood normal.         Behavior: Behavior normal.         Result Review :     The following data was reviewed by: MING Gonzalez on 04/08/2025:                       Assessment and Plan      Diagnoses and all orders for this visit:    1. Abnormal chest x-ray (Primary)  Assessment & Plan:  Reviewed ER record, labs and CXR; Will repeat the CXR in a few weeks       2. Essential (primary) hypertension  Assessment & Plan:  Repeat bp improved some, to continue rx and will recheck some labs       3. Generalized abdominal pain  Assessment & Plan:  Sending for KUB and labs     Orders:  -     XR Abdomen KUB; Future  -     Amylase; Future  -     Comprehensive metabolic panel; Future  -     CBC w AUTO Differential; Future    4. Other constipation  Assessment & Plan:  Send her for labs and a stat KUB    Orders:  -     XR Abdomen KUB; Future  -     TSH Rfx On Abnormal To Free T4    5. Colon cancer screening  Assessment & Plan:  She still has cologuard kit, recommended she collect and send in       6. Pre-diabetes  Assessment & Plan:  She  may need to stop the GLP 1                      Follow Up     Return if symptoms worsen or fail to improve, for followup pending lab results, follow up pending x-ray result.    Patient was given instructions and counseling regarding her condition or for health maintenance advice. Please see specific information pulled into the AVS if appropriate.

## 2025-04-09 LAB
ALBUMIN SERPL-MCNC: 4.1 G/DL (ref 3.5–5.2)
ALBUMIN/GLOB SERPL: 1.1 G/DL
ALP SERPL-CCNC: 74 U/L (ref 39–117)
ALT SERPL W P-5'-P-CCNC: 9 U/L (ref 1–33)
AMYLASE SERPL-CCNC: 69 U/L (ref 28–100)
ANION GAP SERPL CALCULATED.3IONS-SCNC: 10.1 MMOL/L (ref 5–15)
AST SERPL-CCNC: 18 U/L (ref 1–32)
BILIRUB SERPL-MCNC: 0.5 MG/DL (ref 0–1.2)
BUN SERPL-MCNC: 12 MG/DL (ref 8–23)
BUN/CREAT SERPL: 8.6 (ref 7–25)
CALCIUM SPEC-SCNC: 10 MG/DL (ref 8.6–10.5)
CHLORIDE SERPL-SCNC: 103 MMOL/L (ref 98–107)
CO2 SERPL-SCNC: 24.9 MMOL/L (ref 22–29)
CREAT SERPL-MCNC: 1.4 MG/DL (ref 0.57–1)
EGFRCR SERPLBLD CKD-EPI 2021: 40.6 ML/MIN/1.73
GLOBULIN UR ELPH-MCNC: 3.6 GM/DL
GLUCOSE SERPL-MCNC: 115 MG/DL (ref 65–99)
POTASSIUM SERPL-SCNC: 4.2 MMOL/L (ref 3.5–5.2)
PROT SERPL-MCNC: 7.7 G/DL (ref 6–8.5)
SODIUM SERPL-SCNC: 138 MMOL/L (ref 136–145)
TSH SERPL DL<=0.05 MIU/L-ACNC: 1.66 UIU/ML (ref 0.27–4.2)

## 2025-04-10 ENCOUNTER — TRANSCRIBE ORDERS (OUTPATIENT)
Dept: ADMINISTRATIVE | Facility: HOSPITAL | Age: 70
End: 2025-04-10
Payer: MEDICARE

## 2025-04-10 DIAGNOSIS — N18.31 CHRONIC KIDNEY DISEASE, STAGE 3A: Primary | ICD-10-CM

## 2025-04-15 ENCOUNTER — OFFICE VISIT (OUTPATIENT)
Dept: NEUROLOGY | Facility: CLINIC | Age: 70
End: 2025-04-15
Payer: MEDICARE

## 2025-04-15 VITALS
BODY MASS INDEX: 47.85 KG/M2 | SYSTOLIC BLOOD PRESSURE: 156 MMHG | HEIGHT: 64 IN | DIASTOLIC BLOOD PRESSURE: 106 MMHG | WEIGHT: 280.3 LBS

## 2025-04-15 DIAGNOSIS — R41.3 MEMORY CHANGES: Primary | ICD-10-CM

## 2025-04-15 NOTE — PROGRESS NOTES
"Chief Complaint  Memory Loss    Subjective          Blanca Olga Giles presents to [facility]  History of Present Illness    History of Present Illness  The patient is a 69-year-old female who presents to the office for an initial evaluation of memory concerns. She is accompanied by her daughter, who has expressed concerns about her memory.    Memory lapses have been reported, which her daughter has also observed. A few months ago, she became disoriented while driving her grandchildren, aged 7 and 8, to their school, despite having lived in the area for over 40 years. This event was particularly distressing. Instances of repetitive conversations with her daughter have occurred, which she does not remember having previously. She lives independently, with her daughter frequently checking in on her.     She manages her own finances without difficulty and has not experienced any issues related to confusion or forgetfulness in this regard. An increase in stress levels has been acknowledged recently. She has been on various antidepressants since the death of her son 3 years ago and expresses concern about potential long-term effects of these medications on her brain. A history of dyslexia is noted, and getting lost is a common occurrence for her.    SOCIAL HISTORY  Hobbies: Spending time with grandkids, attending [employer] classes.      MEDICATIONS  PREVIOUS MEDS:  Prozac       Objective   Vital Signs:   BP (!) 156/106   Ht 162.6 cm (64\")   Wt 127 kg (280 lb 4.8 oz)   BMI 48.11 kg/m²     Physical Exam  HENT:      Head: Normocephalic.   Pulmonary:      Effort: Pulmonary effort is normal.   Neurological:      Mental Status: She is alert and oriented to person, place, and time.      Sensory: Sensation is intact.      Motor: Motor function is intact.      Coordination: Coordination is intact.      Deep Tendon Reflexes: Reflexes are normal and symmetric.        Neurological " Exam  Mental Status  Alert. Oriented to person, place, and time.    Sensory  Normal sensation.    Reflexes  Deep tendon reflexes are 2+ and symmetric in all four extremities.    Coordination    Finger-to-nose, rapid alternating movements and heel-to-shin normal bilaterally without dysmetria.      Result Review :   CMP:  Lab Results   Component Value Date    BUN 12 04/08/2025    CREATININE 1.40 (H) 04/08/2025    EGFRIFNONA 65 01/31/2022    EGFRIFAFRI 75 01/31/2022     04/08/2025    K 4.2 04/08/2025     04/08/2025    CALCIUM 10.0 04/08/2025    ALBUMIN 4.1 04/08/2025    BILITOT 0.5 04/08/2025    ALKPHOS 74 04/08/2025    AST 18 04/08/2025    ALT 9 04/08/2025               Assessment and Plan    Diagnoses and all orders for this visit:    1. Memory changes (Primary)  -     MRI Brain With & Without Contrast; Future  -     Methylmalonic Acid, Serum; Future  -     Vitamin B12 & Folate; Future  -     CBC (No Diff); Future        Assessment & Plan  1. Memory concerns.  Her cognitive assessment conducted in the office revealed a slight decline, with a score of 25 out of 30. None of her current medications are known to cause memory issues. However, unmanaged anxiety and depression can contribute to memory concerns. A brain MRI will be ordered to establish a baseline and rule out any underlying conditions such as vascular disease. Additionally, blood work will be conducted to check for vitamin deficiencies or other factors that could be affecting her memory. She is advised to maintain an active lifestyle, both physically and mentally, to support long-term cognitive health. It would be beneficial for her daughter, who has expressed concerns about her memory, to accompany her to the next appointment.       I spent 40 minutes caring for Blanca on this date of service. This time includes time spent by me in the following activities:preparing for the visit, reviewing tests, obtaining and/or reviewing a separately obtained  history, performing a medically appropriate examination and/or evaluation , counseling and educating the patient/family/caregiver, ordering medications, tests, or procedures, documenting information in the medical record, and independently interpreting results and communicating that information with the patient/family/caregiver  Follow Up   Return in about 3 months (around 7/15/2025) for memory f/u.  Patient was given instructions and counseling regarding her condition or for health maintenance advice. Please see specific information pulled into the AVS if appropriate.       Patient or patient representative verbalized consent for the use of Ambient Listening during the visit with  MING Osorio for chart documentation. 4/17/2025  09:27 EDT

## 2025-05-06 ENCOUNTER — HOSPITAL ENCOUNTER (OUTPATIENT)
Dept: MRI IMAGING | Facility: HOSPITAL | Age: 70
Discharge: HOME | End: 2025-05-06
Admitting: NURSE PRACTITIONER
Payer: MEDICARE

## 2025-05-06 DIAGNOSIS — R41.3 MEMORY CHANGES: ICD-10-CM

## 2025-05-06 PROCEDURE — 70551 MRI BRAIN STEM W/O DYE: CPT

## 2025-05-07 ENCOUNTER — APPOINTMENT (OUTPATIENT)
Dept: GENERAL RADIOLOGY | Facility: HOSPITAL | Age: 70
End: 2025-05-07
Payer: MEDICARE

## 2025-05-07 ENCOUNTER — TELEPHONE (OUTPATIENT)
Dept: FAMILY MEDICINE CLINIC | Age: 70
End: 2025-05-07
Payer: MEDICARE

## 2025-05-07 ENCOUNTER — APPOINTMENT (OUTPATIENT)
Dept: CT IMAGING | Facility: HOSPITAL | Age: 70
End: 2025-05-07
Payer: MEDICARE

## 2025-05-07 ENCOUNTER — HOSPITAL ENCOUNTER (EMERGENCY)
Facility: HOSPITAL | Age: 70
Discharge: HOME | End: 2025-05-07
Attending: EMERGENCY MEDICINE
Payer: MEDICARE

## 2025-05-07 ENCOUNTER — OFFICE VISIT (OUTPATIENT)
Dept: FAMILY MEDICINE CLINIC | Age: 70
End: 2025-05-07
Payer: MEDICARE

## 2025-05-07 VITALS
OXYGEN SATURATION: 98 % | DIASTOLIC BLOOD PRESSURE: 144 MMHG | BODY MASS INDEX: 47.8 KG/M2 | HEIGHT: 64 IN | TEMPERATURE: 98.2 F | HEART RATE: 88 BPM | WEIGHT: 280 LBS | SYSTOLIC BLOOD PRESSURE: 188 MMHG

## 2025-05-07 VITALS
OXYGEN SATURATION: 95 % | TEMPERATURE: 98.6 F | HEIGHT: 64 IN | HEART RATE: 79 BPM | DIASTOLIC BLOOD PRESSURE: 79 MMHG | WEIGHT: 293 LBS | SYSTOLIC BLOOD PRESSURE: 148 MMHG | BODY MASS INDEX: 50.02 KG/M2 | RESPIRATION RATE: 20 BRPM

## 2025-05-07 DIAGNOSIS — R19.7 DIARRHEA, UNSPECIFIED TYPE: Primary | ICD-10-CM

## 2025-05-07 DIAGNOSIS — I10 PRIMARY HYPERTENSION: Primary | ICD-10-CM

## 2025-05-07 DIAGNOSIS — I63.9 ACUTE CVA (CEREBROVASCULAR ACCIDENT): ICD-10-CM

## 2025-05-07 DIAGNOSIS — R51.9 ACUTE NONINTRACTABLE HEADACHE, UNSPECIFIED HEADACHE TYPE: ICD-10-CM

## 2025-05-07 LAB
ALBUMIN SERPL-MCNC: 3.9 G/DL (ref 3.5–5.2)
ALBUMIN/GLOB SERPL: 1.2 G/DL
ALP SERPL-CCNC: 67 U/L (ref 39–117)
ALT SERPL W P-5'-P-CCNC: 7 U/L (ref 1–33)
ANION GAP SERPL CALCULATED.3IONS-SCNC: 15 MMOL/L (ref 5–15)
AST SERPL-CCNC: 16 U/L (ref 1–32)
BASOPHILS # BLD MANUAL: 0.22 10*3/MM3 (ref 0–0.2)
BASOPHILS NFR BLD MANUAL: 2 % (ref 0–1.5)
BILIRUB SERPL-MCNC: 0.5 MG/DL (ref 0–1.2)
BUN SERPL-MCNC: 13 MG/DL (ref 8–23)
BUN/CREAT SERPL: 10.8 (ref 7–25)
CALCIUM SPEC-SCNC: 9.9 MG/DL (ref 8.6–10.5)
CHLORIDE SERPL-SCNC: 100 MMOL/L (ref 98–107)
CLUMPED PLATELETS: PRESENT
CO2 SERPL-SCNC: 24 MMOL/L (ref 22–29)
CREAT SERPL-MCNC: 1.2 MG/DL (ref 0.57–1)
DEPRECATED RDW RBC AUTO: 47.9 FL (ref 37–54)
EGFRCR SERPLBLD CKD-EPI 2021: 48.8 ML/MIN/1.73
ERYTHROCYTE [DISTWIDTH] IN BLOOD BY AUTOMATED COUNT: 14.1 % (ref 12.3–15.4)
GEN 5 1HR TROPONIN T REFLEX: 17 NG/L
GLOBULIN UR ELPH-MCNC: 3.3 GM/DL
GLUCOSE SERPL-MCNC: 140 MG/DL (ref 65–99)
HCT VFR BLD AUTO: 46.5 % (ref 34–46.6)
HGB BLD-MCNC: 15 G/DL (ref 12–15.9)
HOLD SPECIMEN: NORMAL
HOLD SPECIMEN: NORMAL
LYMPHOCYTES # BLD MANUAL: 1.19 10*3/MM3 (ref 0.7–3.1)
LYMPHOCYTES NFR BLD MANUAL: 5 % (ref 5–12)
MAGNESIUM SERPL-MCNC: 1.9 MG/DL (ref 1.6–2.4)
MCH RBC QN AUTO: 30.1 PG (ref 26.6–33)
MCHC RBC AUTO-ENTMCNC: 32.3 G/DL (ref 31.5–35.7)
MCV RBC AUTO: 93.4 FL (ref 79–97)
MONOCYTES # BLD: 0.54 10*3/MM3 (ref 0.1–0.9)
NEUTROPHILS # BLD AUTO: 8.88 10*3/MM3 (ref 1.7–7)
NEUTROPHILS NFR BLD MANUAL: 82 % (ref 42.7–76)
PLATELET # BLD AUTO: 318 10*3/MM3 (ref 140–450)
PMV BLD AUTO: 11.6 FL (ref 6–12)
POTASSIUM SERPL-SCNC: 3.7 MMOL/L (ref 3.5–5.2)
PROT SERPL-MCNC: 7.2 G/DL (ref 6–8.5)
RBC # BLD AUTO: 4.98 10*6/MM3 (ref 3.77–5.28)
RBC MORPH BLD: NORMAL
SMUDGE CELLS BLD QL SMEAR: ABNORMAL
SODIUM SERPL-SCNC: 139 MMOL/L (ref 136–145)
TROPONIN T % DELTA: -11
TROPONIN T NUMERIC DELTA: -2 NG/L
TROPONIN T SERPL HS-MCNC: 19 NG/L
VARIANT LYMPHS NFR BLD MANUAL: 1 % (ref 0–5)
VARIANT LYMPHS NFR BLD MANUAL: 10 % (ref 19.6–45.3)
WBC NRBC COR # BLD AUTO: 10.83 10*3/MM3 (ref 3.4–10.8)
WHOLE BLOOD HOLD COAG: NORMAL
WHOLE BLOOD HOLD SPECIMEN: NORMAL

## 2025-05-07 PROCEDURE — 85007 BL SMEAR W/DIFF WBC COUNT: CPT | Performed by: EMERGENCY MEDICINE

## 2025-05-07 PROCEDURE — 93005 ELECTROCARDIOGRAM TRACING: CPT | Performed by: EMERGENCY MEDICINE

## 2025-05-07 PROCEDURE — 85025 COMPLETE CBC W/AUTO DIFF WBC: CPT | Performed by: EMERGENCY MEDICINE

## 2025-05-07 PROCEDURE — 71045 X-RAY EXAM CHEST 1 VIEW: CPT

## 2025-05-07 PROCEDURE — 25510000001 IOPAMIDOL PER 1 ML: Performed by: EMERGENCY MEDICINE

## 2025-05-07 PROCEDURE — 74177 CT ABD & PELVIS W/CONTRAST: CPT

## 2025-05-07 PROCEDURE — 80053 COMPREHEN METABOLIC PANEL: CPT | Performed by: EMERGENCY MEDICINE

## 2025-05-07 PROCEDURE — 36415 COLL VENOUS BLD VENIPUNCTURE: CPT

## 2025-05-07 PROCEDURE — 99285 EMERGENCY DEPT VISIT HI MDM: CPT

## 2025-05-07 PROCEDURE — 83735 ASSAY OF MAGNESIUM: CPT | Performed by: EMERGENCY MEDICINE

## 2025-05-07 PROCEDURE — 84484 ASSAY OF TROPONIN QUANT: CPT | Performed by: EMERGENCY MEDICINE

## 2025-05-07 RX ORDER — IOPAMIDOL 755 MG/ML
100 INJECTION, SOLUTION INTRAVASCULAR
Status: COMPLETED | OUTPATIENT
Start: 2025-05-07 | End: 2025-05-07

## 2025-05-07 RX ORDER — SODIUM CHLORIDE 0.9 % (FLUSH) 0.9 %
10 SYRINGE (ML) INJECTION AS NEEDED
Status: DISCONTINUED | OUTPATIENT
Start: 2025-05-07 | End: 2025-05-07 | Stop reason: HOSPADM

## 2025-05-07 RX ORDER — CEFDINIR 300 MG/1
300 CAPSULE ORAL 2 TIMES DAILY
COMMUNITY
End: 2025-05-07

## 2025-05-07 RX ORDER — OXYCODONE AND ACETAMINOPHEN 5; 325 MG/1; MG/1
1 TABLET ORAL EVERY 6 HOURS PRN
COMMUNITY
End: 2025-05-07

## 2025-05-07 RX ORDER — AMLODIPINE BESYLATE 5 MG/1
5 TABLET ORAL
Status: DISCONTINUED | OUTPATIENT
Start: 2025-05-07 | End: 2025-05-07 | Stop reason: HOSPADM

## 2025-05-07 RX ORDER — AMOXICILLIN 500 MG/1
1000 CAPSULE ORAL 2 TIMES DAILY
COMMUNITY
End: 2025-05-07

## 2025-05-07 RX ADMIN — IOPAMIDOL 100 ML: 755 INJECTION, SOLUTION INTRAVENOUS at 19:23

## 2025-05-07 NOTE — ED PROVIDER NOTES
Time: 4:48 PM EDT  Date of encounter:  2025  Independent Historian/Clinical History and Information was obtained by:   Patient and Family    History is limited by: N/A    Chief Complaint: Headache and hypertension      History of Present Illness:  Patient is a 69 y.o. year old female who presents to the emergency department for evaluation of hypertension and headache.  Patient had an MRI yesterday of the brain, was called this morning and told that her MRI showed a stroke, and she needed to go to see her primary care ASAP.  She made an appointment with her daughters primary care, who sent her to the ED.  Patient states other than a severe headache today, her abdomen has been swelling for the last 2 days.  Her abdomen is tender to palpation.  Currently she is alert and oriented x 3 denies vision changes, neuro assessment is intact, denies chest pain, denies nausea, vomiting, diaphoresis.  Patient denies hematuria, dysuria, diarrhea, or constipation.      Patient Care Team  Primary Care Provider: Sonia Perez APRN    Past Medical History:     No Known Allergies  Past Medical History:   Diagnosis Date    Abnormal weight gain     Acne rosacea     Anxiety disorder, unspecified     Anxiety with depression     Central obesity     Cough     COVID-19     Dizziness     Essential (primary) hypertension     GERD (gastroesophageal reflux disease)     High risk medication use     Hip pain     Hypo-osmolality and hyponatremia     Insomnia     Localized swelling, mass and lump, head     Myalgia, unspecified site     Nausea with vomiting, unspecified     OA (osteoarthritis) of knee     Other long term (current) drug therapy     Other obesity     Pain in unspecified joint     Primary insomnia     Rash and other nonspecific skin eruption     Syncope     admitted once on      Syncope and collapse     Unilateral primary osteoarthritis, right knee      Past Surgical History:   Procedure Laterality Date      SECTION      COLONOSCOPY      GASTRIC BAND ADJUSTMENT  01/14/2019    gastric band port repositioning 1-14-19     GASTRIC BANDING      LAPAROSCOPY WITH LASER      Prior gynecologic procedures include laporoscopy to remove scarring from BTL.      REDUCTION MAMMAPLASTY      REPLACEMENT TOTAL KNEE Right 08/2016    TUBAL ABDOMINAL LIGATION       Family History   Problem Relation Age of Onset    Other Mother         COVID19; CAUSE OF DEATH    Arrhythmia Mother     Hypertension Mother     Lung cancer Father         CAUSE OF DEATH    Hypertension Sister     Other Sister         COVID19    Hypertension Brother     Diabetes type II Brother        Home Medications:  Prior to Admission medications    Medication Sig Start Date End Date Taking? Authorizing Provider   amLODIPine-benazepril (LOTREL 5-20) 5-20 MG per capsule Take 1 capsule by mouth Daily. 3/11/25   Sonia Perez APRN   amoxicillin (AMOXIL) 500 MG capsule Take 2 capsules by mouth 2 (Two) Times a Day.    Emerson Peralta MD   buPROPion XL (WELLBUTRIN XL) 300 MG 24 hr tablet Take 1 tablet by mouth Daily. 1/30/24   Sonia Perez APRN   cefdinir (OMNICEF) 300 MG capsule Take 1 capsule by mouth 2 (Two) Times a Day.    Emerson Peralta MD   ciclopirox (PENLAC) 8 % solution Apply to affected toenail(s) & adjacent skin once daily with weekly nail trimming and debridement. Remove with alcohol every 7 days and restart. 6/7/24   Sonia Perez APRN   doxycycline (VIBRAMYCIN) 100 MG capsule Take 1 capsule by mouth Daily. 3/24/25   Emerson Peralta MD   escitalopram (LEXAPRO) 20 MG tablet TAKE 1 TABLET BY MOUTH DAILY 2/27/24   Sonia Perez APRN   furosemide (LASIX) 20 MG tablet Take 1 tablet by mouth Daily As Needed (swelling). 3/11/25   Sonia Perez APRN   oxyCODONE-acetaminophen (PERCOCET) 5-325 MG per tablet Take 1 tablet by mouth Every 6 (Six) Hours As Needed.    Emerson Peralta MD   Semaglutide-Weight  "Management (Wegovy) 0.25 MG/0.5ML solution auto-injector Inject 0.5 mL under the skin into the appropriate area as directed 1 (One) Time Per Week. 1/23/25   Sonia Perez APRN        Social History:   Social History     Tobacco Use    Smoking status: Never     Passive exposure: Past    Smokeless tobacco: Never   Vaping Use    Vaping status: Never Used   Substance Use Topics    Alcohol use: Yes     Comment:  When she drinks, the average quantity of alcohol is 1.   She typically consumes white wine.      Drug use: Yes     Types: Marijuana     Comment: has used to help sleep, gummies         Review of Systems:  Review of Systems   Constitutional: Negative.    Eyes: Negative.    Respiratory: Negative.     Cardiovascular: Negative.    Gastrointestinal: Negative.    Endocrine: Negative.    Genitourinary: Negative.    Musculoskeletal: Negative.    Allergic/Immunologic: Negative.    Neurological:  Positive for headaches. Negative for dizziness, tremors, syncope, weakness, light-headedness and numbness.   Hematological: Negative.    Psychiatric/Behavioral: Negative.          Physical Exam:  /93 (BP Location: Right arm, Patient Position: Sitting)   Pulse 84   Temp 98.3 °F (36.8 °C) (Oral)   Resp 18   Ht 162.6 cm (64\")   Wt 133 kg (293 lb 3.4 oz)   SpO2 95%   BMI 50.33 kg/m²     Physical Exam  Constitutional:       Appearance: She is obese.   HENT:      Head: Normocephalic and atraumatic.   Eyes:      Conjunctiva/sclera: Conjunctivae normal.   Cardiovascular:      Rate and Rhythm: Normal rate and regular rhythm.      Pulses: Normal pulses.      Heart sounds: Normal heart sounds.   Pulmonary:      Effort: Pulmonary effort is normal.      Breath sounds: Normal breath sounds.   Abdominal:      General: Bowel sounds are normal.   Musculoskeletal:         General: Normal range of motion.      Cervical back: Normal range of motion and neck supple.   Skin:     General: Skin is warm and dry.      Capillary " Refill: Capillary refill takes less than 2 seconds.   Neurological:      General: No focal deficit present.      Mental Status: She is alert and oriented to person, place, and time.   Psychiatric:         Mood and Affect: Mood normal.         Behavior: Behavior normal.                    Medical Decision Making:      Comorbidities that affect care:    Hypertension, Obesity    External Notes reviewed:    Previous Clinic Note: Was seen April 15 by primary care for concern regarding memory problems .  PCP ordered an MRI of the brain which was completed on 5/6/2025.      The following orders were placed and all results were independently analyzed by me:  Orders Placed This Encounter   Procedures    XR Chest 1 View    CT Abdomen Pelvis With Contrast    [place] Draw    Comprehensive Metabolic Panel    High Sensitivity Troponin T    Magnesium    Urinalysis With Microscopic If Indicated (No Culture) - Urine, Clean Catch    CBC Auto Differential    Manual Differential    High Sensitivity Troponin T 1Hr    NPO Diet NPO Type: Strict NPO    Undress & Gown    Continuous Pulse Oximetry    Vital Signs    Orthostatic Blood Pressure    Inpatient Hospitalist Consult    Oxygen Therapy- Nasal Cannula; Titrate 1-6 LPM Per SpO2; 90 - 95%    POC Glucose Once    ECG 12 Lead Other; hypertension and headache    Insert Peripheral IV    Fall Precautions    CBC & Differential    Green Top (Gel)    Lavender Top    Gold Top - SST    Light Blue Top       Medications Given in the Emergency Department:  Medications   sodium chloride 0.9 % flush 10 mL (has no administration in time range)   amLODIPine (NORVASC) tablet 5 mg (0 mg Oral Hold 5/7/25 1813)   iopamidol (ISOVUE-370) 76 % injection 100 mL (100 mL Intravenous Given 5/7/25 1923)        ED Course:         Labs:    Lab Results (last 24 hours)       Procedure Component Value Units Date/Time    CBC & Differential [[id number]]  (Abnormal) Collected: 05/07/25 1729    Specimen: Blood Updated: 05/07/25  1813    Narrative:      The following orders were created for panel order CBC & Differential.  Procedure                               Abnormality         Status                     ---------                               -----------         ------                     CBC Auto Differential[[id number]]        Abnormal            Final result               Scan Slide[[id number]]                                                                    Please view results for these tests on the individual orders.    Comprehensive Metabolic Panel [[id number]]  (Abnormal) Collected: 05/07/25 1729    Specimen: Blood Updated: 05/07/25 1848     Glucose 140 mg/dL      BUN 13 mg/dL      Creatinine 1.20 mg/dL      Sodium 139 mmol/L      Potassium 3.7 mmol/L      Chloride 100 mmol/L      CO2 24.0 mmol/L      Calcium 9.9 mg/dL      Total Protein 7.2 g/dL      Albumin 3.9 g/dL      ALT (SGPT) 7 U/L      AST (SGOT) 16 U/L      Alkaline Phosphatase 67 U/L      Total Bilirubin 0.5 mg/dL      Globulin 3.3 gm/dL      A/G Ratio 1.2 g/dL      BUN/Creatinine Ratio 10.8     Anion Gap 15.0 mmol/L      eGFR 48.8 mL/min/1.73     Narrative:      GFR Categories in Chronic Kidney Disease (CKD)              GFR Category          GFR (mL/min/1.73)    Interpretation  G1                    90 or greater        Normal or high (1)  G2                    60-89                Mild decrease (1)  G3a                   45-59                Mild to moderate decrease  G3b                   30-44                Moderate to severe decrease  G4                    15-29                Severe decrease  G5                    14 or less           Kidney failure    (1)In the absence of evidence of kidney disease, neither GFR category G1 or G2 fulfill the criteria for CKD.    eGFR calculation 2021 CKD-EPI creatinine equation, which does not include race as a factor    High Sensitivity Troponin T [[id number]]  (Abnormal) Collected: 05/07/25 1729    Specimen: Blood Updated:  05/07/25 1813     HS Troponin T 19 ng/L     Narrative:      High Sensitive Troponin T Reference Range:  <14.0 ng/L- Negative Female for AMI  <22.0 ng/L- Negative Male for AMI  >=14 - Abnormal Female indicating possible myocardial injury.  >=22 - Abnormal Male indicating possible myocardial injury.   Clinicians would have to utilize clinical acumen, EKG, Troponin, and serial changes to determine if it is an Acute Myocardial Infarction or myocardial injury due to an underlying chronic condition.         Magnesium [[id number]]  (Normal) Collected: 05/07/25 1729    Specimen: Blood Updated: 05/07/25 1848     Magnesium 1.9 mg/dL     CBC Auto Differential [[id number]]  (Abnormal) Collected: 05/07/25 1729    Specimen: Blood Updated: 05/07/25 1813     WBC 10.83 10*3/mm3      RBC 4.98 10*6/mm3      Hemoglobin 15.0 g/dL      Hematocrit 46.5 %      MCV 93.4 fL      MCH 30.1 pg      MCHC 32.3 g/dL      RDW 14.1 %      RDW-SD 47.9 fl      MPV 11.6 fL      Platelets 318 10*3/mm3     Narrative:      The previously reported component NRBC is no longer being reported. Previous result was 0.0 /100 WBC (Reference Range: 0.0-0.2 /100 WBC) on 5/7/2025 at 1757 EDT.    Manual Differential [[id number]]  (Abnormal) Collected: 05/07/25 1729    Specimen: Blood Updated: 05/07/25 1813     Neutrophil % 82.0 %      Lymphocyte % 10.0 %      Monocyte % 5.0 %      Basophil % 2.0 %      Atypical Lymphocyte % 1.0 %      Neutrophils Absolute 8.88 10*3/mm3      Lymphocytes Absolute 1.19 10*3/mm3      Monocytes Absolute 0.54 10*3/mm3      Basophils Absolute 0.22 10*3/mm3      RBC Morphology Normal     Smudge Cells Slight/1+     Clumped Platelets Present    High Sensitivity Troponin T 1Hr [[id number]]  (Abnormal) Collected: 05/07/25 1844    Specimen: Blood Updated: 05/07/25 1922     HS Troponin T 17 ng/L      Troponin T Numeric Delta -2 ng/L      Troponin T % Delta -11    Narrative:      High Sensitive Troponin T Reference Range:  <14.0 ng/L- Negative Female  for AMI  <22.0 ng/L- Negative Male for AMI  >=14 - Abnormal Female indicating possible myocardial injury.  >=22 - Abnormal Male indicating possible myocardial injury.   Clinicians would have to utilize clinical acumen, EKG, Troponin, and serial changes to determine if it is an Acute Myocardial Infarction or myocardial injury due to an underlying chronic condition.                  Imaging:    CT Abdomen Pelvis With Contrast  Result Date: 5/7/2025  CT ABDOMEN PELVIS W CONTRAST Date of Exam: 5/7/2025 7:04 PM EDT Indication: Abdomen distention x 2 days. Comparison: Abdomen x-ray 4/8/2025 Technique: Axial CT images were obtained of the abdomen and pelvis after the uneventful intravenous administration of iodinated contrast. Reconstructed coronal and sagittal images were also obtained. Automated exposure control and iterative construction methods were used. Findings: Lower Thorax: Lung bases are clear. Peritoneum: No free air or free fluid. Appendix: Appendix is not well seen. No significant inflammatory findings in the right lower quadrant. Kidneys: No hydronephrosis. No renal calculi. No focal renal lesions. Ureters: No obstructing calculi or mass. Urinary bladder: Urinary bladder is not well distended and not well evaluated given degree of distention. Liver: Small probable cyst in the left lateral hepatic lobe measures about 9 mm. Normal size liver. Gallbladder and bile ducts: No gallstones. No bile duct dilatation. Gallbladder has normal appearance. Spleen: Spleen is normal size.  No focal splenic lesions. Adrenal glands: Unremarkable. Pancreas: No focal masses.  No pancreatic duct dilation. No surrounding inflammation. Abdominal aorta and Vascular Structures: No aneurysmal dilation. Mild atherosclerotic disease. Stomach and Bowel: Small hiatal hernia. Lap band device. There are diverticuli throughout the colon. None are acutely inflamed. No significant bowel wall thickening.  Ligament of [place] has normal anatomic  position. Reproductive Organs: Within normal limits. Lymph nodes: No pathologically enlarged lymph nodes. Soft tissues: Unremarkable. Osseous structures: No aggressive focal lytic or sclerotic osseous lesions.     1.Diverticulosis without diverticulitis. 2.Lap band device with small hiatal hernia. Electronically Signed: Joleen Hoffman MD  5/7/2025 7:46 PM EDT  Workstation ID: [id number]    XR Chest 1 View  Result Date: 5/7/2025  XR CHEST 1 VW Date of Exam: 5/7/2025 5:29 PM EDT Indication: Weak/Dizzy/AMS triage protocol Comparison: Chest radiograph performed on February 26, 2025 Findings: Lung volumes are low. No focal consolidation or effusion.  There is no evidence of pneumothorax.  The pulmonary vasculature appears within normal limits. Cardiac silhouette is mildly enlarged. No acute osseous abnormality identified.     Impression: Low lung volumes without evidence of acute disease. Electronically Signed: Ganesh Saleh MD  5/7/2025 5:51 PM EDT  Workstation ID: [id number]        Differential Diagnosis and Discussion:    Headache: Differential diagnosis includes but is not limited to migraine, cluster headache, hypertension, tumor, subarachnoid bleeding, pseudotumor cerebri, temporal arteritis, infections, tension headache, and TMJ syndrome.    PROCEDURES:    Labs were collected in the emergency department and all labs were reviewed and interpreted by me.  X-ray were performed in the emergency department and all X-ray impressions were independently interpreted by me.  An EKG was performed and the EKG was interpreted by me.    ECG 12 Lead Other; hypertension and headache   Preliminary Result   HEART RATE=86  bpm   RR Riwoesmp=140  ms   NJ Icahqxbt=350  ms   P Horizontal Axis=-12  deg   P Front Axis=59  deg   QRSD Zzoyvffn=579  ms   QT Bihtppas=461  ms   IVqG=253  ms   QRS Axis=-24  deg   T Wave Axis=79  deg   - ABNORMAL ECG -   Sinus rhythm   Probable left atrial enlargement   LVH with secondary repolarization abnormality    Date and Time of Study:2025-05-07 17:07:04          Procedures    MDM  The patient presents to the ED with hypertension. The patient was placed on a monitor in the ED. The blood pressure is much improved with ED treatment. The patient denies chest pain. The patient has a normal neurological exam and has mental status at baseline. Upon re-examination, the patient has no signs or symptoms of acute end organ damage.  The patient was advised of the importance of medical compliance with an emphasis in awareness of their daily sodium intake. The patient was counseled that elevated blood pressure is detrimental to their heart, eyes, kidneys, and may lead to a stroke. The patient was advised to check their blood pressure regularly and follow up as an outpatient regarding this matter. The patient was counseled to return to the ED for sudden or severe headache, numbness or tingling on one side of the body, facial droop, difficulty speaking, chest pain, or shortness of breath. The patient's condition is stable and appropriate for discharge. The patient will pursue further outpatient evaluation with the primary care physician or other designated or consulting physician as indicated in the discharge instructions.  The patient has an appointment with neurology tomorrow as she had an MRI performed yesterday that shows a lacunar infarct.  She is instructed that managing a good blood pressure is the best way to decrease the risk of having another infarct.                    Patient Care Considerations:    SEPSIS was considered but is NOT present in the emergency department as SIRS criteria is not present.      Consultants/Shared Management Plan:    Hospitalist: I have discussed the case with Dr. Solorio who agrees to accept the patient for admission.  After assessing and evaluating the patient Dr. Solorio agreed to send the patient home as she has a neurology appointment tomorrow, and advised that she had not taken her blood  pressure medicine for 3 days prior to today's hypertensive episode.    Social Determinants of Health:    Patient is independent, reliable, and has access to care.       Disposition and Care Coordination:    Admit:   Through independent evaluation of the patient's history, physical, and imperical data, the patient meets criteria for inpatient admission to the hospital.    I have explained the patient´s condition, diagnoses and treatment plan based on the information available to me at this time. I have answered questions and addressed any concerns. The patient has a good  understanding of the patient´s diagnosis, condition, and treatment plan as can be expected at this point. The vital signs have been stable. The patient´s condition is stable and appropriate for discharge from the emergency department.      The patient will pursue further outpatient evaluation with the primary care physician or other designated or consulting physician as outlined in the discharge instructions. They are agreeable to this plan of care and follow-up instructions have been explained in detail. The patient has received these instructions in written format and has expressed an understanding of the discharge instructions. The patient is aware that any significant change in condition or worsening of symptoms should prompt an immediate return to this or the closest emergency department or call to 911.  I have explained discharge medications and the need for follow up with the patient/caretakers. This was also printed in the discharge instructions. Patient was discharged with the following medications and follow up:      Medication List      No changes were made to your prescriptions during this visit.      Sonia Perez, APRESVIN  [address] [address]  [phone]    In 1 week      Neurology    In 1 day         Final diagnoses:   Primary hypertension        ED Disposition       ED Disposition   Discharge     Condition   Stable    Comment   --               This medical record created using voice recognition software.             Gely Goff, APRN  05/07/25 [id number]

## 2025-05-07 NOTE — TELEPHONE ENCOUNTER
1st attempt: spoke with pt re overdue XR ordered XR Chest PA & Lateral (04/08/2025 16:56). Patient stated she has had a stroke & will not be able to complete this right now.

## 2025-05-07 NOTE — ED PROVIDER NOTES
"SHARED VISIT ATTESTATION:    This visit was performed by myself and an APC.  I personally approved the management plan/medical decision making and take responsibility for the patient management.      SHARED VISIT NOTE:    Patient is 69 y.o. year old female that presents to the ED for evaluation of a normal MRI.     Physical Exam    ED Course:    /93 (BP Location: Right arm, Patient Position: Sitting)   Pulse 84   Temp 98.3 °F (36.8 °C) (Oral)   Resp 18   Ht 162.6 cm (64\")   Wt 133 kg (293 lb 3.4 oz)   SpO2 95%   BMI 50.33 kg/m²   Results for orders placed or performed during the hospital encounter of 05/07/25   ECG 12 Lead Other; hypertension and headache    Collection Time: 05/07/25  5:07 PM   Result Value Ref Range    QT Interval 390 ms    QTC Interval 466 ms   Comprehensive Metabolic Panel    Collection Time: 05/07/25  5:29 PM    Specimen: Blood   Result Value Ref Range    Glucose 140 (H) 65 - 99 mg/dL    BUN 13 8 - 23 mg/dL    Creatinine 1.20 (H) 0.57 - 1.00 mg/dL    Sodium 139 136 - 145 mmol/L    Potassium 3.7 3.5 - 5.2 mmol/L    Chloride 100 98 - 107 mmol/L    CO2 24.0 22.0 - 29.0 mmol/L    Calcium 9.9 8.6 - 10.5 mg/dL    Total Protein 7.2 6.0 - 8.5 g/dL    Albumin 3.9 3.5 - 5.2 g/dL    ALT (SGPT) 7 1 - 33 U/L    AST (SGOT) 16 1 - 32 U/L    Alkaline Phosphatase 67 39 - 117 U/L    Total Bilirubin 0.5 0.0 - 1.2 mg/dL    Globulin 3.3 gm/dL    A/G Ratio 1.2 g/dL    BUN/Creatinine Ratio 10.8 7.0 - 25.0    Anion Gap 15.0 5.0 - 15.0 mmol/L    eGFR 48.8 (L) >60.0 mL/min/1.73   High Sensitivity Troponin T    Collection Time: 05/07/25  5:29 PM    Specimen: Blood   Result Value Ref Range    HS Troponin T 19 (H) <14 ng/L   Magnesium    Collection Time: 05/07/25  5:29 PM    Specimen: Blood   Result Value Ref Range    Magnesium 1.9 1.6 - 2.4 mg/dL   CBC Auto Differential    Collection Time: 05/07/25  5:29 PM    Specimen: Blood   Result Value Ref Range    WBC 10.83 (H) 3.40 - 10.80 10*3/mm3    RBC 4.98 3.77 - " 5.28 10*6/mm3    Hemoglobin 15.0 12.0 - 15.9 g/dL    Hematocrit 46.5 34.0 - 46.6 %    MCV 93.4 79.0 - 97.0 fL    MCH 30.1 26.6 - 33.0 pg    MCHC 32.3 31.5 - 35.7 g/dL    RDW 14.1 12.3 - 15.4 %    RDW-SD 47.9 37.0 - 54.0 fl    MPV 11.6 6.0 - 12.0 fL    Platelets 318 140 - 450 10*3/mm3   Manual Differential    Collection Time: 05/07/25  5:29 PM    Specimen: Blood   Result Value Ref Range    Neutrophil % 82.0 (H) 42.7 - 76.0 %    Lymphocyte % 10.0 (L) 19.6 - 45.3 %    Monocyte % 5.0 5.0 - 12.0 %    Basophil % 2.0 (H) 0.0 - 1.5 %    Atypical Lymphocyte % 1.0 0.0 - 5.0 %    Neutrophils Absolute 8.88 (H) 1.70 - 7.00 10*3/mm3    Lymphocytes Absolute 1.19 0.70 - 3.10 10*3/mm3    Monocytes Absolute 0.54 0.10 - 0.90 10*3/mm3    Basophils Absolute 0.22 (H) 0.00 - 0.20 10*3/mm3    RBC Morphology Normal Normal    Smudge Cells Slight/1+ None Seen    Clumped Platelets Present None Seen   Green Top (Gel)    Collection Time: 05/07/25  5:29 PM   Result Value Ref Range    Extra Tube Hold for add-ons.    Lavender Top    Collection Time: 05/07/25  5:29 PM   Result Value Ref Range    Extra Tube hold for add-on    Gold Top - SST    Collection Time: 05/07/25  5:29 PM   Result Value Ref Range    Extra Tube Hold for add-ons.    Light Blue Top    Collection Time: 05/07/25  5:29 PM   Result Value Ref Range    Extra Tube Hold for add-ons.    High Sensitivity Troponin T 1Hr    Collection Time: 05/07/25  6:44 PM    Specimen: Blood   Result Value Ref Range    HS Troponin T 17 (H) <14 ng/L    Troponin T Numeric Delta -2 ng/L    Troponin T % Delta -11 Abnormal if >/= 20%     Medications   sodium chloride 0.9 % flush 10 mL (has no administration in time range)   amLODIPine (NORVASC) tablet 5 mg (0 mg Oral Hold 5/7/25 1813)   iopamidol (ISOVUE-370) 76 % injection 100 mL (100 mL Intravenous Given 5/7/25 1923)     XR Chest 1 View  Result Date: 5/7/2025  Narrative: XR CHEST 1 VW Date of Exam: 5/7/2025 5:29 PM EDT Indication: Weak/Dizzy/AMS triage  protocol Comparison: Chest radiograph performed on February 26, 2025 Findings: Lung volumes are low. No focal consolidation or effusion.  There is no evidence of pneumothorax.  The pulmonary vasculature appears within normal limits. Cardiac silhouette is mildly enlarged. No acute osseous abnormality identified.     Impression: Impression: Low lung volumes without evidence of acute disease. Electronically Signed: Ganesh Saleh MD  5/7/2025 5:51 PM EDT  Workstation ID: [id number]    MRI Brain Without Contrast  Result Date: 5/6/2025  Narrative: MRI BRAIN WO CONTRAST Date of Exam: 5/6/2025 2:00 PM EDT Indication: memory changes.  Comparison: None available. Technique:  Routine multiplanar/multisequence sequence images of the brain were obtained without contrast administration. Findings: There is a focus of restricted diffusion in the posterior right frontal periventricular white matter extending anteroinferiorly toward the basal ganglia consistent with an acute infarct. There is no evidence of intracranial hemorrhage on 2D/images. There are multiple foci of T2 high signal in the periventricular and deep cerebral white matter bilaterally. The ventricles are normal in size and configuration. No overt mass is identified. Intravenous contrast was not given for the examination. No focal calvarial abnormality is demonstrated. Visualized extracranial soft tissues appear normal.     Impression: Impression: Small acute infarct in the posterior right frontal periventricular white matter extending anteroinferiorly across the posterior limb internal capsule toward the basal ganglia. No acute intracranial hemorrhage. Multiple T2 high signal foci in the cerebral white matter most likely representing small vessel ischemic disease and/or chronic lacunar infarction. Other possibilities such as vasculitis or demyelinating disease are considered possible but less likely. Electronically Signed: Sterling Domínguez MD  5/6/2025 3:56 PM EDT   Workstation ID: [id number]    XR Abdomen KUB  Result Date: 4/8/2025  Narrative: XR ABDOMEN KUB Date of Exam: 4/8/2025 3:38 PM EDT Indication: constipation Comparison: None available. Findings: No abnormal bowel distention is seen in a pattern to suggest bowel obstruction. There is stool in the ascending colon, transverse colon, and rectum. Lap band is noted. Included lung bases are clear.     Impression: Impression: Nonobstructive bowel gas pattern. Moderate stool burden. Electronically Signed: Earlene Stubbs  4/8/2025 4:14 PM EDT  Workstation ID: [id number]      MDM:    Xavi Akbar MD  19:26 EDT  05/07/25         Xenia Akbar MD  05/09/25 2055

## 2025-05-08 ENCOUNTER — OFFICE VISIT (OUTPATIENT)
Dept: NEUROLOGY | Facility: CLINIC | Age: 70
End: 2025-05-08
Payer: MEDICARE

## 2025-05-08 VITALS
SYSTOLIC BLOOD PRESSURE: 153 MMHG | HEIGHT: 64 IN | DIASTOLIC BLOOD PRESSURE: 110 MMHG | BODY MASS INDEX: 46.4 KG/M2 | WEIGHT: 271.8 LBS | HEART RATE: 82 BPM

## 2025-05-08 DIAGNOSIS — I63.9 ACUTE STROKE DUE TO ISCHEMIA: Primary | ICD-10-CM

## 2025-05-08 LAB
QT INTERVAL: 390 MS
QTC INTERVAL: 466 MS

## 2025-05-08 RX ORDER — ATORVASTATIN CALCIUM 80 MG/1
80 TABLET, FILM COATED ORAL NIGHTLY
Qty: 90 TABLET | Refills: 1 | Status: SHIPPED | OUTPATIENT
Start: 2025-05-08 | End: 2026-05-08

## 2025-05-08 RX ORDER — ASPIRIN 81 MG/1
81 TABLET ORAL DAILY
Qty: 30 TABLET | Refills: 5 | Status: SHIPPED | OUTPATIENT
Start: 2025-05-08 | End: 2026-05-08

## 2025-05-08 RX ORDER — CLOPIDOGREL BISULFATE 75 MG/1
75 TABLET ORAL DAILY
Qty: 30 TABLET | Refills: 0 | Status: SHIPPED | OUTPATIENT
Start: 2025-05-08 | End: 2026-05-08

## 2025-05-08 NOTE — PROGRESS NOTES
Chief Complaint  Neurologic Problem    Subjective          Blanca Giles presents to [facility]  History of Present Illness    History of Present Illness  The patient presents to the office for follow-up. The primary reason for this visit is to discuss the results of an MRI brain scan and address recent hypertension issues.     She was seen on 04/15/2025 for memory changes, and an MRI brain was ordered at that time. The MRI, completed on 05/06/2025, revealed a small acute infarct in the posterior right frontal periventricular white matter extending anteriorly and inferiorly across the posterior limb capsule towards the basal ganglia. Additionally, multiple T2 high signal foci were noted in the cerebral white matter, likely representing small vessel disease or chronic lacunar infarction.     Yesterday, she visited the emergency department due to hypertension, having not taken her blood pressure medication for three days. Her blood pressure was elevated in the ER and remains elevated today at 153/110. She is currently on amlodipine-benazepril combo and Lasix for blood pressure management.    She reports no knowledge of having experienced a stroke. Her daughter has expressed concerns regarding her mother's cognitive function, citing instances of forgetfulness, such as misplacing items and struggling with word recall. There was also an incident where she forgot the route home while driving, necessitating assistance from her grandson.    She has been compliant with her antihypertensive medication regimen, which includes amlodipine-benazepril and Lasix, taken daily. However, she admits to occasionally skipping doses of Lasix due to its diuretic effect, which can be inconvenient when she plans to leave the house. Her daughter has been monitoring her blood pressure at home and reports that it has consistently been elevated.    INTERVAL: Since last visit, she has had an MRI  "revealing a small acute infarct and multiple T2 high signal foci. She also visited the emergency department for hypertension due to non-compliance with her blood pressure medication.        MEDICATIONS  CURRENT MEDS:  Amlodipine Oral Daily  Compliance: Missed doses for 3 days  Benazepril Oral Daily  Compliance: Missed doses for 3 days  Lasix Oral Daily       Objective   Vital Signs:   BP (!) 153/110   Pulse 82   Ht 162.6 cm (64\")   Wt 123 kg (271 lb 12.8 oz)   BMI 46.65 kg/m²     Physical Exam  HENT:      Head: Normocephalic.   Pulmonary:      Effort: Pulmonary effort is normal.   Neurological:      Mental Status: She is alert and oriented to person, place, and time.      Sensory: Sensation is intact.      Motor: Motor function is intact.      Coordination: Coordination is intact.      Deep Tendon Reflexes: Reflexes are normal and symmetric.        Neurological Exam  Mental Status  Alert. Oriented to person, place, and time.    Sensory  Normal sensation.    Reflexes  Deep tendon reflexes are 2+ and symmetric in all four extremities.    Coordination    Finger-to-nose, rapid alternating movements and heel-to-shin normal bilaterally without dysmetria.      Result Review :   CBC:  Lab Results   Component Value Date    WBC 10.83 (H) 05/07/2025    RBC 4.98 05/07/2025    HGB 15.0 05/07/2025    HCT 46.5 05/07/2025    MCV 93.4 05/07/2025    MCH 30.1 05/07/2025    MCHC 32.3 05/07/2025    RDW 14.1 05/07/2025     05/07/2025     CMP:  Lab Results   Component Value Date    BUN 13 05/07/2025    CREATININE 1.20 (H) 05/07/2025    EGFRIFNONA 65 01/31/2022    EGFRIFAFRI 75 01/31/2022     05/07/2025    K 3.7 05/07/2025     05/07/2025    CALCIUM 9.9 05/07/2025    ALBUMIN 3.9 05/07/2025    BILITOT 0.5 05/07/2025    ALKPHOS 67 05/07/2025    AST 16 05/07/2025    ALT 7 05/07/2025     LIPID PANEL:  Lab Results   Component Value Date    CHLPL 218 (H) 01/31/2022    TRIG 89 03/11/2025    HDL 50 03/11/2025    VLDL 15 " 03/11/2025     (H) 03/11/2025    LDLHDL 3.58 03/11/2025     TSH/FREE T4:   Lab Results   Component Value Date    TSH 1.660 04/08/2025                Assessment and Plan    Diagnoses and all orders for this visit:    1. Acute stroke due to ischemia (Primary)  -     CT Angiogram Head; Future  -     CT Angiogram Neck With & Without Contrast; Future    Other orders  -     atorvastatin (Lipitor) 80 MG tablet; Take 1 tablet by mouth Every Night.  Dispense: 90 tablet; Refill: 1  -     clopidogrel (Plavix) 75 MG tablet; Take 1 tablet by mouth Daily.  Dispense: 30 tablet; Refill: 0  -     aspirin 81 MG EC tablet; Take 1 tablet by mouth Daily.  Dispense: 30 tablet; Refill: 5        Assessment & Plan  1. Acute Stroke.  The MRI brain completed on 05/06/2025 showed a small acute infarct in the posterior right frontal periventricular white matter. She will be started on Plavix 75 mg daily for one month, along with baby aspirin 81 mg daily. After the first month, she will continue with only the baby aspirin. A CT angiogram of the head and neck will be ordered to check for any significant blockages in the carotid arteries or brain vessels. If there are any urgent findings on the CTA, she will be contacted immediately. If she experiences any bleeding in her gums or stool, she should notify the office. If she experiences sudden onset of weakness on one side of her body, difficulty speaking, or a sudden change in balance, she should go to the hospital within the first 4 hours of symptom onset.    2. Hypertension.  Her blood pressure was elevated at 153/110 in the office today. She is currently on amlodipine-benazepril combination and Lasix for blood pressure management. She is advised to take her blood pressure medications consistently. If her blood pressure remains unmanaged after two weeks of consistent medication use, a referral to cardiology will be considered.    3. Hyperlipidemia.  Her LDL cholesterol was elevated at 182  in March 2025. She will be started on high-dose atorvastatin 40 mg at bedtime to manage her cholesterol levels. Lifestyle modifications, including physical activity and dietary changes to reduce cholesterol intake, are recommended.    4. Memory Changes.  She was initially seen for memory changes and had a cognitive test score of 25 out of 30 before the stroke. Memory issues will be revisited after the acute management of her stroke and blood pressure.    Follow-up  The patient will follow up in 2 months.       I spent 55 minutes caring for Blanca on this date of service. This time includes time spent by me in the following activities:preparing for the visit, reviewing tests, obtaining and/or reviewing a separately obtained history, performing a medically appropriate examination and/or evaluation , counseling and educating the patient/family/caregiver, ordering medications, tests, or procedures, referring and communicating with other health care professionals , documenting information in the medical record, and independently interpreting results and communicating that information with the patient/family/caregiver  Follow Up   Return in about 2 months (around 7/8/2025) for stroke f/u.  Patient was given instructions and counseling regarding her condition or for health maintenance advice. Please see specific information pulled into the AVS if appropriate.       Patient or patient representative verbalized consent for the use of Ambient Listening during the visit with  MING Osorio for chart documentation. 5/9/2025  13:50 EDT

## 2025-05-08 NOTE — DISCHARGE INSTRUCTIONS
You presented to the emergency department today following results from an MRI that showed you had had a lacunar stroke.  You have no residual, and have a neurology appointment tomorrow.  Please keep your neurology appointment tomorrow and follow-up with your primary care in 1 week.  Be sure to take your blood pressure medicine daily, as this is the best prevention for a future stroke.

## 2025-05-08 NOTE — PATIENT INSTRUCTIONS
Stroke Prevention  Some medical conditions and behaviors can lead to a higher chance of having a stroke. You can help prevent a stroke by eating healthy, exercising, not smoking, and managing any medical conditions you have.  Stroke is a leading cause of functional impairment. Primary prevention is particularly important because a majority of strokes are first-time events. Stroke changes the lives of not only those who experience a stroke but also their family and other caregivers.  How can this condition affect me?  A stroke is a medical emergency and should be treated right away. A stroke can lead to brain damage and can sometimes be life-threatening. If a person gets medical treatment right away, there is a better chance of surviving and recovering from a stroke.  What can increase my risk?  The following medical conditions may increase your risk of a stroke:  Cardiovascular disease.  High blood pressure (hypertension).  Diabetes.  High cholesterol.  Sickle cell disease.  Blood clotting disorders (hypercoagulable state).  Obesity.  Sleep disorders (obstructive sleep apnea).  Other risk factors include:  Being older than age 60.  Having a history of blood clots, stroke, or mini-stroke (transient ischemic attack, TIA).  Genetic factors, such as race, ethnicity, or a family history of stroke.  Smoking cigarettes or using other tobacco products.  Taking birth control pills, especially if you also use tobacco.  Heavy use of alcohol or drugs, especially cocaine and methamphetamine.  Physical inactivity.  What actions can I take to prevent this?  Manage your health conditions  High cholesterol levels.  Eating a healthy diet is important for preventing high cholesterol. If cholesterol cannot be managed through diet alone, you may need to take medicines.  Take any prescribed medicines to control your cholesterol as told by your health care provider.  Hypertension.  To reduce your risk of stroke, try to keep your blood  pressure below 130/80.  Eating a healthy diet and exercising regularly are important for controlling blood pressure. If these steps are not enough to manage your blood pressure, you may need to take medicines.  Take any prescribed medicines to control hypertension as told by your health care provider.  Ask your health care provider if you should monitor your blood pressure at home.  Have your blood pressure checked every year, even if your blood pressure is normal. Blood pressure increases with age and some medical conditions.  Diabetes.  Eating a healthy diet and exercising regularly are important parts of managing your blood sugar (glucose). If your blood sugar cannot be managed through diet and exercise, you may need to take medicines.  Take any prescribed medicines to control your diabetes as told by your health care provider.  Get evaluated for obstructive sleep apnea. Talk to your health care provider about getting a sleep evaluation if you snore a lot or have excessive sleepiness.  Make sure that any other medical conditions you have, such as atrial fibrillation or atherosclerosis, are managed.  Nutrition  Follow instructions from your health care provider about what to eat or drink to help manage your health condition. These instructions may include:  Reducing your daily calorie intake.  Limiting how much salt (sodium) you use to 1,500 milligrams (mg) each day.  Using only healthy fats for cooking, such as olive oil, canola oil, or sunflower oil.  Eating healthy foods. You can do this by:  Choosing foods that are high in fiber, such as whole grains, and fresh fruits and vegetables.  Eating at least 5 servings of fruits and vegetables a day. Try to fill one-half of your plate with fruits and vegetables at each meal.  Choosing lean protein foods, such as lean cuts of meat, poultry without skin, fish, tofu, beans, and nuts.  Eating low-fat dairy products.  Avoiding foods that are high in sodium. This can help  "lower blood pressure.  Avoiding foods that have saturated fat, trans fat, and cholesterol. This can help prevent high cholesterol.  Avoiding processed and prepared foods.  Counting your daily carbohydrate intake.    Lifestyle  If you drink alcohol:  Limit how much you have to:  0-1 drink a day for women who are not pregnant.  0-2 drinks a day for men.  Know how much alcohol is in your drink. In the [place] one drink equals one 12 oz bottle of beer (355mL), one 5 oz glass of wine (148mL), or one 1½ oz glass of hard liquor (44mL).  Do not use any products that contain nicotine or tobacco. These products include cigarettes, chewing tobacco, and vaping devices, such as e-cigarettes. If you need help quitting, ask your health care provider.  Avoid secondhand smoke.  Do not use drugs.  Activity    Try to stay at a healthy weight.  Get at least 30 minutes of exercise on most days, such as:  Fast walking.  Biking.  Swimming.  Medicines  Take over-the-counter and prescription medicines only as told by your health care provider. Aspirin or blood thinners (antiplatelets or anticoagulants) may be recommended to reduce your risk of forming blood clots that can lead to stroke.  Avoid taking birth control pills. Talk to your health care provider about the risks of taking birth control pills if:  You are over 35 years old.  You smoke.  You get very bad headaches.  You have had a blood clot.  Where to find more information  American Stroke Association: www.strokeassociation.org  Get help right away if:  You or a loved one has any symptoms of a stroke. \"BE FAST\" is an easy way to remember the main warning signs of a stroke:  B - Balance. Signs are dizziness, sudden trouble walking, or loss of balance.  E - Eyes. Signs are trouble seeing or a sudden change in vision.  F - Face. Signs are sudden weakness or numbness of the face, or the face or eyelid drooping on one side.  A - Arms. Signs are weakness or numbness in an arm. This happens " "suddenly and usually on one side of the body.  S - Speech. Signs are sudden trouble speaking, slurred speech, or trouble understanding what people say.  T - Time. Time to call emergency services. Write down what time symptoms started.  You or a loved one has other signs of a stroke, such as:  A sudden, severe headache with no known cause.  Nausea or vomiting.  Seizure.  These symptoms may represent a serious problem that is an emergency. Do not wait to see if the symptoms will go away. Get medical help right away. Call your local emergency services (911 in the [place] Do not drive yourself to the hospital.  Summary  You can help to prevent a stroke by eating healthy, exercising, not smoking, limiting alcohol intake, and managing any medical conditions you may have.  Do not use any products that contain nicotine or tobacco. These include cigarettes, chewing tobacco, and vaping devices, such as e-cigarettes. If you need help quitting, ask your health care provider.  Remember \"BE FAST\" for warning signs of a stroke. Get help right away if you or a loved one has any of these signs.  This information is not intended to replace advice given to you by your health care provider. Make sure you discuss any questions you have with your health care provider.  Document Revised: 11/20/2023 Document Reviewed: 11/20/2023  Elsevier Patient Education © 2024 [employer]  "

## 2025-05-08 NOTE — PROGRESS NOTES
Chief Complaint  Headache, Results (Pt would like to go over her recent MRI results w/ Provider today ), and Hypertension    Subjective      Blanca Giles is a 69 y.o. female who presents to [facility]     Patient presents to same day clinic with chief complaint of headache.  She is regularly followed by [facility] for treatment of hypertension, anxiety, and depression.  She reports that she was feeing poorly over the last .  2 weeks.  She was seen on 4/15/2025 in Neurology clinic for a new consultation regarding memory loss.  She scored a 25/30 on her cognitive assessment.  An MRI of the brain without contrast was order and obtained yesterday   History of Present Illness  Tuesday 5/6/2025 at 2 pm.  This showed a small acute infarct in the posterior right frontal periventricular white matter extending anterioinferioly across the posterior limb internal capsule towards the basal ganglia. No hemorrhage was seen.  There was evidence of small vessel ischemic disease and/or lacunar infarct. She was informed by her neurologist today.  Upon arrival to the clinic patient complained of a headache.  She reports compliance with her medication consisting of Lotrel 5mg/20mg po daily and lasix 20 mg po daily.  She is accompanied today by her daughter who is very concerned about how high her mom's blood pressure has gotten which has exacerbated her recent complaints of memory loss.  Mrs. Giles lives alone.  She reports waking up with a headache.  She does have some recent weakness and fatigue but denies any inability to use her extremities, vision changes, or falls.  She has not taken any acetaminophen or ibuprofen for her headache.  She denies any nausea or vomiting, no slurred speech or dizziness.            Patient Care Team:  Sonia Perez APRN as PCP - Ida Lott APRN as Nurse Practitioner (Nurse Practitioner)    Objective   Vital Signs:   Vitals:  "   05/07/25 1402   BP: (!) 188/144   BP Location: Right arm   Patient Position: Sitting   Cuff Size: Large Adult   Pulse: 88   Temp: 98.2 °F (36.8 °C)   TempSrc: Oral   SpO2: 98%   Weight: 127 kg (280 lb)  Comment: Pt refused to be weighed; Used Pt's previous wt from last OV   Height: 162.6 cm (64.02\")     Body mass index is 48.04 kg/m².    Wt Readings from Last 3 Encounters:   05/07/25 133 kg (293 lb 3.4 oz)   05/07/25 127 kg (280 lb)   05/06/25 127 kg (280 lb)     BP Readings from Last 3 Encounters:   05/07/25 148/79   05/07/25 (!) 188/144   04/15/25 (!) 156/106       Health Maintenance   Topic Date Due    TDAP/TD VACCINES (1 - Tdap) Never done    ZOSTER VACCINE (1 of 2) Never done    DXA SCAN  10/03/2021    COVID-19 Vaccine (3 - 2024-25 season) 09/01/2024    COLORECTAL CANCER SCREENING  01/03/2025    MAMMOGRAM  06/03/2025 (Originally 10/3/2021)    Pneumococcal Vaccine 50+ (1 of 1 - PCV) 11/12/2025 (Originally 10/12/2004)    INFLUENZA VACCINE  07/01/2025    ANNUAL WELLNESS VISIT  11/27/2025    LIPID PANEL  03/11/2026    HEPATITIS C SCREENING  Completed       Physical Exam  Constitutional:       Appearance: She is obese.   HENT:      Head: Normocephalic and atraumatic.      Right Ear: External ear normal.      Left Ear: External ear normal.      Nose: Nose normal.      Mouth/Throat:      Pharynx: Oropharynx is clear. No posterior oropharyngeal erythema.   Eyes:      General: No scleral icterus.     Extraocular Movements: Extraocular movements intact.      Conjunctiva/sclera: Conjunctivae normal.      Pupils: Pupils are equal, round, and reactive to light.   Neck:      Vascular: No carotid bruit.   Cardiovascular:      Rate and Rhythm: Normal rate and regular rhythm.      Pulses: Normal pulses.      Heart sounds: Normal heart sounds. No murmur heard.     No friction rub. No gallop.   Pulmonary:      Effort: Pulmonary effort is normal.      Breath sounds: Normal breath sounds. No wheezing or rhonchi.   Abdominal:     "  General: Bowel sounds are normal. There is no distension.      Palpations: Abdomen is soft.      Tenderness: There is no guarding or rebound.   Musculoskeletal:         General: No swelling. Normal range of motion.      Cervical back: Normal range of motion and neck supple.      Right lower leg: No edema.      Left lower leg: No edema.   Lymphadenopathy:      Cervical: No cervical adenopathy.   Skin:     General: Skin is warm and dry.      Findings: No rash.   Neurological:      Mental Status: She is alert and oriented to person, place, and time. Mental status is at baseline.      Cranial Nerves: No cranial nerve deficit.      Motor: No weakness.   Psychiatric:         Mood and Affect: Mood normal.          Physical Exam        Result Review   The following data was reviewed by: Benigno Deutsch MD on 05/07/2025:  [x]  Tests & Results  []  Hospitalization/Emergency Department/Urgent Care  []  Internal/External Consultant Notes    Results    25 5:51 PM EDT  Workstation ID: [id number]    MRI Brain Without Contrast  Result Date: 5/6/2025  Impression: Small acute infarct in the posterior right frontal periventricular white matter extending anteroinferiorly across the posterior limb internal capsule toward the basal ganglia. No acute intracranial hemorrhage. Multiple T2 high signal foci in the cerebral white matter most likely representing small vessel ischemic disease and/or chronic lacunar infarction. Other possibilities such as vasculitis or demyelinating disease are considered possible but less likely. Electronically Signed: Sterling Domínguez MD  5/6/2025 3:56 PM EDT  Workstation ID: [id number]      Procedures          ASSESSMENT/PLAN  Assessment & Plan  Acute CVA (cerebrovascular accident)  MRI brain without contast on 5/6 revealed acute right frontal CVA and chronic small vessel ischemic changes.  Patient initial evaluation with neurology on 4/15 for memory loss that is recent.  She has spoken with them and has an  appointment tomorrow at 1:30 pm. However, given the acute stroke, recent memory loss, and in the setting of hypertensive urgency with a blood pressure of 188/144,and bp of 156/106 on 4/15 despite compliance with lotrel 5mg/20mg po daily. No evidence of motor involvement on exam.  Recommend reporting to the Emergency Department for immediate evaluation due possible hypertensive urgency in the setting of acute R frontal stroke, recent memory and mood changes.and direction on antiplatelet therapy and carotid artery US along with an echocardiogram.    Acute nonintractable headache, unspecified headache type      Due to hypertensive urgency. Acute CVA  May need repeat imaging.          Diarrhea, unspecified type  Defer to GI workup       Patient encouraged to take ambulance but is insistent upon driving with her family.  This she will not concede. She will be going to [facility].  Staff to inform ER.  Assessment & Plan                FOLLOW UP  Patient can follow up in clinic with me upon disposition from ER/Hospital.  Patient was given instructions and counseling regarding her condition or for health maintenance advice. Please see specific information pulled into the AVS if appropriate.       Benigno Deutsch MD  05/07/25  23:54 EDT

## 2025-05-09 ENCOUNTER — TELEPHONE (OUTPATIENT)
Dept: NEUROLOGY | Facility: CLINIC | Age: 70
End: 2025-05-09

## 2025-05-09 NOTE — TELEPHONE ENCOUNTER
Provider: ABDULAZIZ    Caller: Modesta Giles    Relationship to Patient: Emergency Contact    Phone Number: [phone]     Reason for Call: PATIENT WILL BE TRANSFERRING CARE TO THE [place] LOCATION, PER PROTOCOL, SENDING A COURTESY MESSAGE.     PLEASE REVIEW     incontinence

## 2025-05-09 NOTE — TELEPHONE ENCOUNTER
Caller: Modesta Giles    Relationship: Emergency Contact    Best call back number: [phone]     Who is your current provider:  MING CAMPBELL    Is your current provider offboarding? NO    Who would you like your new provider to be: DR. ALONSO    What are your reasons for transferring care: PATIENT WOULD LIKE MORE OF A COMPRHENSIVE VISIT REGARDING THE PATIENTS STROKE AND PATIENT WOULD LIKE TO SEE A DOCTOR.    PLEASE REVIEW

## 2025-05-14 ENCOUNTER — TELEPHONE (OUTPATIENT)
Dept: NEUROLOGY | Facility: CLINIC | Age: 70
End: 2025-05-14
Payer: MEDICARE

## 2025-05-14 ENCOUNTER — TELEPHONE (OUTPATIENT)
Dept: FAMILY MEDICINE CLINIC | Age: 70
End: 2025-05-14
Payer: MEDICARE

## 2025-05-14 NOTE — TELEPHONE ENCOUNTER
Pt called in stating that her bp is 197/118.  She states that she took her bp med about 30 minutes ago.  She states that she had a stroke last week and she is feeling funny in her head.  I advised her to go straight to the ER as she could be having another stroke.  She asked if she could wait about an hour and see if her bp comes down.  I told her that I wouldn't advise it but if she chooses to do that, its up to her and if her bp was high at that time, she needs to go straight to the ER and not wait any longer, but reiterated that I dont advise waiting.

## 2025-05-14 NOTE — TELEPHONE ENCOUNTER
Called to check which ER patient went to-was not able to find in [facility] or Care Everywhere. Patients daughter states that she did not go, her BP has gone down and she feels better.

## 2025-05-14 NOTE — TELEPHONE ENCOUNTER
Caller: Modesta Giles    Relationship to patient: Emergency Contact; DAUGHTER    Best call back number: [phone]    Chief complaint: PT'S DAUGHTER CALLED TO CHECK STATUS OF SCHEDULING FOR THE ORDERED CTA HEAD & CTA NECKS SCANS. I ADVISED  CENTRALIZED SCHEDULING WOULD TYPICALLY REACH OUT TO THEM DIRECTLY TO SCHEDULE BUT OFFERED TO TRANSFER PT'S DAUGHTER TO CENTRALIZED SCHEDULING TO PROCEED WITH SCHEDULING CT SCANS.    DURING CALL, PT'S DAUGHTER STATES SHE IS ON HER WAY TO PICK PT UP NOW TO TAKE HER TO [facility] BECAUSE AS OF THIS MORNING PT'S BLOOD PRESSURE /120. SHE IS ALSO VERY CONFUSED AND VERY NAUSEOUS. SHE IS TAKING PT TO THE ED TO RULE OUT ANOTHER STROKE.    Patient's daughter directed to call 911 or go to their nearest emergency room.     Patient verbalized understanding: [x] Yes  [] No    ALSO PROVIDED PHONE # AND TRANSFERRED PT'S DAUGHTER TO  CENTRALIZED SCHEDULING.    DOCUMENTING PER PROTOCOL.

## 2025-05-20 ENCOUNTER — HOSPITAL ENCOUNTER (OUTPATIENT)
Dept: CT IMAGING | Facility: HOSPITAL | Age: 70
Discharge: HOME | End: 2025-05-20
Admitting: NURSE PRACTITIONER
Payer: MEDICARE

## 2025-05-20 DIAGNOSIS — I63.9 ACUTE STROKE DUE TO ISCHEMIA: ICD-10-CM

## 2025-05-20 PROCEDURE — 25510000001 IOPAMIDOL PER 1 ML: Performed by: NURSE PRACTITIONER

## 2025-05-20 PROCEDURE — 70498 CT ANGIOGRAPHY NECK: CPT

## 2025-05-20 PROCEDURE — 70496 CT ANGIOGRAPHY HEAD: CPT

## 2025-05-21 PROCEDURE — 25510000001 IOPAMIDOL PER 1 ML: Performed by: NURSE PRACTITIONER

## 2025-05-21 RX ORDER — IOPAMIDOL 755 MG/ML
100 INJECTION, SOLUTION INTRAVASCULAR
Status: COMPLETED | OUTPATIENT
Start: 2025-05-21 | End: 2025-05-21

## 2025-05-21 RX ADMIN — IOPAMIDOL 100 ML: 755 INJECTION, SOLUTION INTRAVENOUS at 08:39

## 2025-05-22 ENCOUNTER — OFFICE VISIT (OUTPATIENT)
Dept: FAMILY MEDICINE CLINIC | Age: 70
End: 2025-05-22
Payer: MEDICARE

## 2025-05-22 VITALS
TEMPERATURE: 97.5 F | DIASTOLIC BLOOD PRESSURE: 91 MMHG | HEART RATE: 92 BPM | OXYGEN SATURATION: 96 % | HEIGHT: 64 IN | BODY MASS INDEX: 46.44 KG/M2 | SYSTOLIC BLOOD PRESSURE: 146 MMHG | WEIGHT: 272 LBS

## 2025-05-22 DIAGNOSIS — R41.3 MEMORY CHANGES: ICD-10-CM

## 2025-05-22 DIAGNOSIS — I10 ESSENTIAL (PRIMARY) HYPERTENSION: Primary | ICD-10-CM

## 2025-05-22 DIAGNOSIS — N28.9 ABNORMAL RENAL FUNCTION: ICD-10-CM

## 2025-05-22 DIAGNOSIS — Z86.73 HISTORY OF STROKE: ICD-10-CM

## 2025-05-22 PROBLEM — R05.9 COUGH: Status: RESOLVED | Noted: 2022-06-16 | Resolved: 2025-05-22

## 2025-05-22 PROCEDURE — 1159F MED LIST DOCD IN RCRD: CPT | Performed by: NURSE PRACTITIONER

## 2025-05-22 PROCEDURE — 3077F SYST BP >= 140 MM HG: CPT | Performed by: NURSE PRACTITIONER

## 2025-05-22 PROCEDURE — 1160F RVW MEDS BY RX/DR IN RCRD: CPT | Performed by: NURSE PRACTITIONER

## 2025-05-22 PROCEDURE — 99214 OFFICE O/P EST MOD 30 MIN: CPT | Performed by: NURSE PRACTITIONER

## 2025-05-22 PROCEDURE — 3079F DIAST BP 80-89 MM HG: CPT | Performed by: NURSE PRACTITIONER

## 2025-05-22 NOTE — ASSESSMENT & PLAN NOTE
Reviewed her chart, labs, MRI, CT's Hypertension: has improved by taking her rx daily. Continue to monitor BP at home but to get a new monitor, not a wrist. Continue current meds. Continue to modify diet and lifestyle.

## 2025-05-22 NOTE — PROGRESS NOTES
Chief Complaint  Hypertension (Elevated BP readings at home over the past week )    Subjective          Blancaumair Giles presents to [facility]  History of Present Illness  Was in the ER  5-7-25, has a MRI of her brain 5-6-25 (ordered due to memory issues)   Saw Neurology NP the next day, started on Plavik for a month and baby ASA that she will stay on, CT of head and neck ordered    MRI brain: Impression: Small acute infarct in the posterior right frontal periventricular white matter extending anteroinferiorly across the posterior limb internal capsule toward the basal ganglia. No acute intracranial hemorrhage. Multiple T2 high signal foci in the cerebral white matter most likely representing small vessel ischemic disease and/or chronic lacunar infarction. Other possibilities such as vasculitis or demyelinating disease are considered possible but less likely. 5/6/2025 3:56 PM EDT      Patient is a 69 y.o. year old female who presents to the emergency department for evaluation of hypertension and headache.  Patient had an MRI yesterday of the brain, was called this morning and told that her MRI showed a stroke, and she needed to go to see her primary care ASAP.  She made an appointment with her daughters primary care, who sent her to the ED.  Patient states other than a severe headache today, her abdomen has been swelling for the last 2 days.  Her abdomen is tender to palpation.  Currently she is alert and oriented x 3 denies vision changes, neuro assessment is intact, denies chest pain, denies nausea, vomiting, diaphoresis.  Patient denies hematuria, dysuria, diarrhea, or constipation.       CT angiogram of head /neck 5-21-25  Mild cervical and intracranial atherosclerotic changes are present as above, without evidence of associated flow-limiting stenosis, large vessel occlusion or aneurysm.    Hypertension  Current medication:  lotrel, lasix   Tolerating Medication:  yes, is  now taking her rx daily, has not always done this   Checking BP at home and it is:  has wrist monitor at home and has run in the 170's systolic   Needs refills:  no  Labs:  Lab Results       Component                Value               Date                       GLUCOSE                  140 (H)             2025                 BUN                      13                  2025                 CREATININE               1.20 (H)            2025                 EGFRIFNONA               65                  2022                 EGFRIFAFRI               75                  2022                 BCR                      10.8                2025                 K                        3.7                 2025                 CO2                      24.0                2025                 CALCIUM                  9.9                 2025                 ALBUMIN                  3.9                 2025                 AST                      16                  2025                 ALT                      7                   2025                  PAST MEDICAL HISTORY changes since 2025:          Summer 2020 covid +        GYNECOLOGICAL HISTORY:    Menopause at age 50.    Hospitalizations: syncope, admitted once on          PREVENTIVE HEALTH MAINTENANCE                  Surgical History:         : X 1;     Tubal Ligation     Breast Reduction     Gastric Banding; Procedures: Prior gynecologic procedures include laporoscopy to remove scarring from BTL.      Joint Replacement: right knee; ; sees Dr Garcia     Procedures: gastric band port repositioning 19         Family History:        Father:  at age 60; Cause of death was lung cancer     Mother:  at 82 Cause of death was COVID;  Arrhythmia; Hypertension     Brother(s):  1 brother Hypertension;  Type 2 Diabetes     Sister(s): 1  at 69 ;  Hypertension;  COVID  19     Son(s): 1 son(s) total; 1      Daughter(s): 3 daughter(s) total     Grandson: 17 year old and has had gastric sleeve surgery       Social History:        Occupation: Retired (Prior occupation: [employer])     Marital Status:      Children: 4 children                Past Medical History:   Diagnosis Date    Abnormal weight gain     Acne rosacea     Anxiety disorder, unspecified     Anxiety with depression     Central obesity     Cough     COVID-19     Dizziness     Essential (primary) hypertension     GERD (gastroesophageal reflux disease)     High risk medication use     Hip pain     Hypo-osmolality and hyponatremia     Insomnia     Localized swelling, mass and lump, head     Myalgia, unspecified site     Nausea with vomiting, unspecified     OA (osteoarthritis) of knee     Other long term (current) drug therapy     Other obesity     Pain in unspecified joint     Primary insomnia     Rash and other nonspecific skin eruption     Syncope     admitted once on      Syncope and collapse     Unilateral primary osteoarthritis, right knee        No Known Allergies     Past Surgical History:   Procedure Laterality Date     SECTION      COLONOSCOPY      GASTRIC BAND ADJUSTMENT  2019    gastric band port repositioning 19     GASTRIC BANDING      LAPAROSCOPY WITH LASER      Prior gynecologic procedures include laporoscopy to remove scarring from BTL.      REDUCTION MAMMAPLASTY      REPLACEMENT TOTAL KNEE Right 2016    TUBAL ABDOMINAL LIGATION          Social History     Tobacco Use    Smoking status: Never     Passive exposure: Past    Smokeless tobacco: Never   Substance Use Topics    Alcohol use: Yes     Comment:  When she drinks, the average quantity of alcohol is 1.   She typically consumes white wine.         Family History   Problem Relation Age of Onset    Other Mother         COVID19; CAUSE OF DEATH    Arrhythmia Mother     Hypertension Mother     Lung  "cancer Father         CAUSE OF DEATH    Hypertension Sister     Other Sister         COVID19    Hypertension Brother     Diabetes type II Brother         Health Maintenance Due   Topic Date Due    TDAP/TD VACCINES (1 - Tdap) Never done    ZOSTER VACCINE (1 of 2) Never done    DXA SCAN  10/03/2021    COVID-19 Vaccine (3 - 2024-25 season) 09/01/2024    COLORECTAL CANCER SCREENING  01/03/2025        Current Outpatient Medications on File Prior to Visit   Medication Sig    amLODIPine-benazepril (LOTREL 5-20) 5-20 MG per capsule Take 1 capsule by mouth Daily.    aspirin 81 MG EC tablet Take 1 tablet by mouth Daily.    atorvastatin (Lipitor) 80 MG tablet Take 1 tablet by mouth Every Night.    buPROPion XL (WELLBUTRIN XL) 300 MG 24 hr tablet Take 1 tablet by mouth Daily.    clopidogrel (Plavix) 75 MG tablet Take 1 tablet by mouth Daily.    escitalopram (LEXAPRO) 20 MG tablet TAKE 1 TABLET BY MOUTH DAILY    furosemide (LASIX) 20 MG tablet Take 1 tablet by mouth Daily As Needed (swelling). (Patient taking differently: Take 1 tablet by mouth Daily.)     No current facility-administered medications on file prior to visit.       Immunization History   Administered Date(s) Administered    COVID-19 (DEMETRIUS) 03/10/2021    COVID-19 (PFIZER) Purple Cap Monovalent 11/10/2021    Influenza, Unspecified 10/05/2011       Review of Systems   Constitutional:  Negative for fatigue and fever.   Respiratory:  Negative for cough and shortness of breath.    Cardiovascular:  Negative for chest pain, palpitations and leg swelling.   Neurological:  Negative for weakness and headache.        Objective     Vitals:    05/22/25 0935 05/22/25 1004   BP: 151/87 146/91   BP Location: Left arm Left arm   Patient Position: Sitting Sitting   Cuff Size: Adult Adult   Pulse: 97 92   Temp: 97.5 °F (36.4 °C)    TempSrc: Oral    SpO2: 96%    Weight: 123 kg (272 lb)    Height: 162.6 cm (64\")             Physical Exam  Vitals reviewed.   Constitutional:       " General: She is not in acute distress.     Appearance: Normal appearance.   Neck:      Vascular: No carotid bruit.   Cardiovascular:      Rate and Rhythm: Normal rate and regular rhythm.      Heart sounds: Normal heart sounds. No murmur heard.  Pulmonary:      Effort: Pulmonary effort is normal. No respiratory distress.      Breath sounds: Normal breath sounds.   Musculoskeletal:      Right lower leg: No edema.      Left lower leg: No edema.   Neurological:      Mental Status: She is alert.   Psychiatric:         Mood and Affect: Mood normal.         Behavior: Behavior normal.         Result Review :     The following data was reviewed by: MING Gonzalez on 05/22/2025:                       Assessment and Plan      Assessment & Plan  Essential (primary) hypertension  Reviewed her chart, labs, MRI, CT's Hypertension: has improved by taking her rx daily. Continue to monitor BP at home but to get a new monitor, not a wrist. Continue current meds. Continue to modify diet and lifestyle.           History of stroke  Follow up with neurology as advised, make sure to take all her rx's as directed        Memory changes  Follow up with neurology as directed        Abnormal renal function  Stay adequately hydrated and to follow up with neph as directed, reviewed last labs                            Follow Up     Return if symptoms worsen or fail to improve, for Next scheduled follow up.    Patient was given instructions and counseling regarding her condition or for health maintenance advice. Please see specific information pulled into the AVS if appropriate.

## 2025-05-28 DIAGNOSIS — F41.9 ANXIETY: ICD-10-CM

## 2025-05-28 RX ORDER — BUPROPION HYDROCHLORIDE 300 MG/1
300 TABLET ORAL DAILY
Qty: 90 TABLET | Refills: 1 | Status: SHIPPED | OUTPATIENT
Start: 2025-05-28

## 2025-05-28 NOTE — TELEPHONE ENCOUNTER
Rx Refill Note  Requested Prescriptions     Pending Prescriptions Disp Refills    buPROPion XL (WELLBUTRIN XL) 300 MG 24 hr tablet 90 tablet 1     Sig: Take 1 tablet by mouth Daily.      Last office visit with prescribing clinician: 5/22/2025   Last telemedicine visit with prescribing clinician: Visit date not found   Next office visit with prescribing clinician: Visit date not found       Stephanie Escamilla LPN  05/28/25, 11:20 EDT    LF-1/30/24 #90, RF-1

## 2025-05-30 ENCOUNTER — TELEPHONE (OUTPATIENT)
Dept: FAMILY MEDICINE CLINIC | Age: 70
End: 2025-05-30
Payer: MEDICARE

## 2025-05-30 NOTE — TELEPHONE ENCOUNTER
Pt called and said she needs A Perez to refill her wegovy.  She said it gets shipped from [place].

## 2025-07-15 ENCOUNTER — OFFICE VISIT (OUTPATIENT)
Dept: NEUROLOGY | Facility: CLINIC | Age: 70
End: 2025-07-15
Payer: MEDICARE

## 2025-07-15 VITALS
DIASTOLIC BLOOD PRESSURE: 81 MMHG | SYSTOLIC BLOOD PRESSURE: 156 MMHG | HEIGHT: 64 IN | BODY MASS INDEX: 47.87 KG/M2 | WEIGHT: 280.4 LBS | HEART RATE: 66 BPM

## 2025-07-15 DIAGNOSIS — Z86.73 HISTORY OF STROKE: Primary | ICD-10-CM

## 2025-07-15 DIAGNOSIS — I10 ESSENTIAL (PRIMARY) HYPERTENSION: ICD-10-CM

## 2025-07-15 DIAGNOSIS — E78.00 HIGH CHOLESTEROL: ICD-10-CM

## 2025-07-15 RX ORDER — ATORVASTATIN CALCIUM 80 MG/1
80 TABLET, FILM COATED ORAL NIGHTLY
Qty: 90 TABLET | Refills: 1 | Status: SHIPPED | OUTPATIENT
Start: 2025-07-15 | End: 2026-07-15

## 2025-07-15 NOTE — PROGRESS NOTES
"Chief Complaint  Neurologic Problem    Subjective          Blanca Giles presents to [facility]  History of Present Illness    History of Present Illness  The patient is a 69-year-old female who presents to the office for a stroke follow-up.    She reports experiencing difficulties with her hand-eye coordination since her stroke, affecting both hands. This has led to incidents such as dropping dishes and breaking a glass. She also mentions struggling with tasks like making her bed due to a disconnect between her brain and hands.     Since the last visit, a CTA head and neck was ordered to check for any significant blockages or concerns in the brain vessels. The results showed minor plaque buildup but no major blockages. She continues to take aspirin and atorvastatin at night.    She expresses interest in receiving weight loss injections, which she found beneficial in the past. These injections were previously administered by her primary care physician, Sonia Harrington. She notes that the injections helped her lose weight, improved her overall well-being, and increased her desire to walk more.    She also mentions that her blood pressure tends to rise when she gains weight.    INTERVAL: Since last visit, a CTA head and neck was ordered and showed minor plaque buildup but no major blockages.    MEDICATIONS  CURRENT MEDS:  Aspirin Every night  Atorvastatin 80 mg Nightly  PREVIOUS MEDS:  Plavix  Reason for Discontinuation: Completed 30 days course       Objective   Vital Signs:   /81   Pulse 66   Ht 162.6 cm (64\")   Wt 127 kg (280 lb 6.4 oz)   BMI 48.13 kg/m²     Physical Exam  HENT:      Head: Normocephalic.   Pulmonary:      Effort: Pulmonary effort is normal.   Neurological:      Mental Status: She is alert and oriented to person, place, and time.      Sensory: Sensation is intact.      Motor: Motor function is intact.      Coordination: Coordination is intact. "      Deep Tendon Reflexes: Reflexes are normal and symmetric.        Neurological Exam  Mental Status  Alert. Oriented to person, place, and time.    Sensory  Normal sensation.    Reflexes  Deep tendon reflexes are 2+ and symmetric in all four extremities.    Coordination    Finger-to-nose, rapid alternating movements and heel-to-shin normal bilaterally without dysmetria.      Result Review :   CBC:  Lab Results   Component Value Date    WBC 10.83 (H) 05/07/2025    RBC 4.98 05/07/2025    HGB 15.0 05/07/2025    HCT 46.5 05/07/2025    MCV 93.4 05/07/2025    MCH 30.1 05/07/2025    MCHC 32.3 05/07/2025    RDW 14.1 05/07/2025     05/07/2025     CMP:  Lab Results   Component Value Date    BUN 13 05/07/2025    CREATININE 1.20 (H) 05/07/2025    EGFRIFNONA 65 01/31/2022    EGFRIFAFRI 75 01/31/2022     05/07/2025    K 3.7 05/07/2025     05/07/2025    CALCIUM 9.9 05/07/2025    ALBUMIN 3.9 05/07/2025    BILITOT 0.5 05/07/2025    ALKPHOS 67 05/07/2025    AST 16 05/07/2025    ALT 7 05/07/2025     LIPID PANEL:  Lab Results   Component Value Date    CHLPL 218 (H) 01/31/2022    TRIG 89 03/11/2025    HDL 50 03/11/2025    VLDL 15 03/11/2025     (H) 03/11/2025    LDLHDL 3.58 03/11/2025     B12:   Lab Results   Component Value Date    FKZSBELF84 381 11/27/2024      FOLATE:   Lab Results   Component Value Date    FOLATE 8.83 11/27/2024               Assessment and Plan    Diagnoses and all orders for this visit:    1. History of stroke (Primary)  -     Blood Pressure Device    2. High cholesterol    3. Essential (primary) hypertension    Other orders  -     atorvastatin (Lipitor) 80 MG tablet; Take 1 tablet by mouth Every Night.  Dispense: 90 tablet; Refill: 1        Assessment & Plan  1. Post-stroke follow-up.  Her stroke was relatively minor, likely secondary to vascular disease. The stroke affected the basal ganglia, which is important for coordination, leading to her current symptoms. The CTA of the head and  neck showed no significant blockages but did reveal some atherosclerosis, which is common for her age. She is advised to continue her daily activities to help improve coordination. She will continue her current regimen of aspirin and atorvastatin 80 mg. If she experiences any issues before the next scheduled visit, she should inform the office immediately.    2. Elevated cholesterol.  Her cholesterol levels have been increasing, with the last check showing an LDL of 182. She will continue taking atorvastatin 80 mg. Her cholesterol levels will be monitored, and adjustments to her medication will be made as necessary during the next visit.    3. Elevated blood pressure.  Her blood pressure is slightly elevated today. She is advised to monitor her blood pressure daily and maintain a log for review by her primary care physician, Sonia Harrington. An order for a blood pressure cuff will be provided, which she can take to the pharmacy to see if it can be covered by her insurance. If her blood pressure remains elevated, Sonia will adjust her medication accordingly.    4. Weight management.  She expressed interest in weight loss injections, which previously helped her lose weight and feel better. Due to current regulatory issues with compounding pharmacies, she is advised to discuss alternative weight loss medications like Ozempic or Wegovy with her primary care physician to get them approved through her insurance.    Follow-up  A follow-up appointment is scheduled for 6 months from now.         Follow Up   Return in about 6 months (around 1/15/2026) for stroke f/u.  Patient was given instructions and counseling regarding her condition or for health maintenance advice. Please see specific information pulled into the AVS if appropriate.       Patient or patient representative verbalized consent for the use of Ambient Listening during the visit with  MING Osorio for chart documentation. 7/17/2025  14:47 EDT

## 2025-07-23 ENCOUNTER — OFFICE VISIT (OUTPATIENT)
Dept: FAMILY MEDICINE CLINIC | Age: 70
End: 2025-07-23
Payer: MEDICARE

## 2025-07-23 ENCOUNTER — PRIOR AUTHORIZATION (OUTPATIENT)
Dept: FAMILY MEDICINE CLINIC | Age: 70
End: 2025-07-23

## 2025-07-23 VITALS
BODY MASS INDEX: 48.32 KG/M2 | HEIGHT: 64 IN | DIASTOLIC BLOOD PRESSURE: 88 MMHG | HEART RATE: 81 BPM | OXYGEN SATURATION: 98 % | SYSTOLIC BLOOD PRESSURE: 150 MMHG | TEMPERATURE: 98.6 F | WEIGHT: 283 LBS

## 2025-07-23 DIAGNOSIS — R73.03 PRE-DIABETES: ICD-10-CM

## 2025-07-23 DIAGNOSIS — I10 ESSENTIAL (PRIMARY) HYPERTENSION: ICD-10-CM

## 2025-07-23 DIAGNOSIS — F41.9 ANXIETY: ICD-10-CM

## 2025-07-23 DIAGNOSIS — E66.01 OBESITY, MORBID, BMI 40.0-49.9: Primary | ICD-10-CM

## 2025-07-23 DIAGNOSIS — Z86.73 HISTORY OF STROKE: ICD-10-CM

## 2025-07-23 LAB
EXPIRATION DATE: NORMAL
HBA1C MFR BLD: 5.7 % (ref 4.5–5.7)
Lab: 216

## 2025-07-23 RX ORDER — SEMAGLUTIDE 0.25 MG/.5ML
0.25 INJECTION, SOLUTION SUBCUTANEOUS WEEKLY
Qty: 2 ML | Refills: 0 | Status: SHIPPED | OUTPATIENT
Start: 2025-07-23

## 2025-07-23 RX ORDER — SEMAGLUTIDE 0.68 MG/ML
0.25 INJECTION, SOLUTION SUBCUTANEOUS WEEKLY
Qty: 3 ML | Refills: 1 | Status: SHIPPED | OUTPATIENT
Start: 2025-07-23 | End: 2025-07-23 | Stop reason: ALTCHOICE

## 2025-07-23 RX ORDER — ESCITALOPRAM OXALATE 20 MG/1
20 TABLET ORAL DAILY
Qty: 90 TABLET | Refills: 1 | Status: SHIPPED | OUTPATIENT
Start: 2025-07-23

## 2025-07-23 RX ORDER — AMLODIPINE AND BENAZEPRIL HYDROCHLORIDE 5; 20 MG/1; MG/1
1 CAPSULE ORAL DAILY
Qty: 90 CAPSULE | Refills: 1 | Status: SHIPPED | OUTPATIENT
Start: 2025-07-23

## 2025-07-23 NOTE — PROGRESS NOTES
Chief Complaint  Hypertension (Follow up, pt would like to discuss weight. )    Subjective          Blanca Olga Giles presents to [facility]  History of Present Illness  Hypertension  Current medication:  lotrel, lasix   Tolerating Medication:  yes  Checking BP at home and it is:  has a new monitor , will start   Needs refills:  yes  Labs:  Lab Results       Component                Value               Date                       GLUCOSE                  140 (H)             05/07/2025                 BUN                      13                  05/07/2025                 CREATININE               1.20 (H)            05/07/2025                 EGFRIFNONA               65                  01/31/2022                 EGFRIFAFRI               75                  01/31/2022                 BCR                      10.8                05/07/2025                 K                        3.7                 05/07/2025                 CO2                      24.0                05/07/2025                 CALCIUM                  9.9                 05/07/2025                 ALBUMIN                  3.9                 05/07/2025                 AST                      16                  05/07/2025                 ALT                      7                   05/07/2025           Pre Diabetes  Current medication:  none, but was on GLP 1 and did well, lost weight, would like to try getting this rx again   Tolerating medication:  yes  Refills needed:  yes to [facility]     Lab Results       Component                Value               Date                       HGBA1C                   6.00 (H)            03/11/2025              Anxiety/ Depression/ Insomnia  Current medication:  wellbutrin & lexapro   Stressors: may need to move, had trees damage her property   Tolerating medication:  yes  Refills needed:  yes, but only needs lexapro          Obesity/ has had gastric banding in the past    Seeing neurology for stroke , last seen 7-15-25, having some weakness /coordination issues, drops glasses since stroke, would like to get some therapy  to help.      PAST MEDICAL HISTORY changes since 2025:          Summer 2020 covid +        GYNECOLOGICAL HISTORY:    Menopause at age 50.    Hospitalizations: syncope, admitted once on          PREVENTIVE HEALTH MAINTENANCE                  Surgical History:         : X 1;     Tubal Ligation     Breast Reduction     Gastric Banding; Procedures: Prior gynecologic procedures include laporoscopy to remove scarring from BTL.      Joint Replacement: right knee; ; sees Dr Garcia     Procedures: gastric band port repositioning 19         Family History:        Father:  at age 60; Cause of death was lung cancer     Mother:  at 82 Cause of death was COVID;  Arrhythmia; Hypertension     Brother(s):  1 brother Hypertension;  Type 2 Diabetes     Sister(s): 1  at 69 ;  Hypertension;  COVID 19     Son(s): 1 son(s) total; 1      Daughter(s): 3 daughter(s) total     Grandson: 17 year old and has had gastric sleeve surgery       Social History:        Occupation: Retired (Prior occupation: [employer])     Marital Status:      Children: 4 children                Past Medical History:   Diagnosis Date    Abnormal weight gain     Acne rosacea     Anxiety disorder, unspecified     Anxiety with depression     Central obesity     Cough     COVID-19     Dizziness     Essential (primary) hypertension     GERD (gastroesophageal reflux disease)     High risk medication use     Hip pain     Hypo-osmolality and hyponatremia     Insomnia     Localized swelling, mass and lump, head     Myalgia, unspecified site     Nausea with vomiting, unspecified     OA (osteoarthritis) of knee     Other long term (current) drug therapy     Other obesity     Pain in unspecified joint     Primary insomnia     Rash and other nonspecific  skin eruption     Syncope     admitted once on      Syncope and collapse     Unilateral primary osteoarthritis, right knee        No Known Allergies     Past Surgical History:   Procedure Laterality Date     SECTION      COLONOSCOPY      GASTRIC BAND ADJUSTMENT  2019    gastric band port repositioning 19     GASTRIC BANDING      LAPAROSCOPY WITH LASER      Prior gynecologic procedures include laporoscopy to remove scarring from BTL.      REDUCTION MAMMAPLASTY      REPLACEMENT TOTAL KNEE Right 2016    TUBAL ABDOMINAL LIGATION          Social History     Tobacco Use    Smoking status: Never     Passive exposure: Past    Smokeless tobacco: Never   Substance Use Topics    Alcohol use: Yes     Comment:  When she drinks, the average quantity of alcohol is 1.   She typically consumes white wine.         Family History   Problem Relation Age of Onset    Other Mother         COVID19; CAUSE OF DEATH    Arrhythmia Mother     Hypertension Mother     Lung cancer Father         CAUSE OF DEATH    Hypertension Sister     Other Sister         COVID19    Hypertension Brother     Diabetes type II Brother         Health Maintenance Due   Topic Date Due    TDAP/TD VACCINES (1 - Tdap) Never done    ZOSTER VACCINE (1 of 2) Never done    MAMMOGRAM  10/03/2021    DXA SCAN  10/03/2021    COVID-19 Vaccine (3 - 2024-25 season) 2024    COLORECTAL CANCER SCREENING  2025        Current Outpatient Medications on File Prior to Visit   Medication Sig    aspirin 81 MG EC tablet Take 1 tablet by mouth Daily.    atorvastatin (Lipitor) 80 MG tablet Take 1 tablet by mouth Every Night.    buPROPion XL (WELLBUTRIN XL) 300 MG 24 hr tablet Take 1 tablet by mouth Daily.    furosemide (LASIX) 20 MG tablet Take 1 tablet by mouth Daily As Needed (swelling). (Patient taking differently: Take 1 tablet by mouth Daily.)    [DISCONTINUED] amLODIPine-benazepril (LOTREL 5-20) 5-20 MG per capsule Take 1 capsule by mouth Daily.     "[DISCONTINUED] escitalopram (LEXAPRO) 20 MG tablet TAKE 1 TABLET BY MOUTH DAILY     No current facility-administered medications on file prior to visit.       Immunization History   Administered Date(s) Administered    COVID-19 (DEMETRIUS) 03/10/2021    COVID-19 (PFIZER) Purple Cap Monovalent 11/10/2021    Influenza, Unspecified 10/05/2011       Review of Systems   Constitutional:  Negative for fatigue and fever.   Respiratory:  Negative for cough and shortness of breath.    Cardiovascular:  Negative for chest pain, palpitations and leg swelling.        Objective     Vitals:    07/23/25 1121 07/23/25 1232   BP: 153/82 150/88   BP Location: Right arm Left arm   Patient Position: Sitting Sitting   Cuff Size: Adult Adult   Pulse: 86 81   Temp: 98.6 °F (37 °C)    TempSrc: Oral    SpO2: 98%    Weight: 128 kg (283 lb)    Height: 162.6 cm (64\")             Physical Exam  Vitals reviewed.   Constitutional:       General: She is not in acute distress.     Appearance: Normal appearance. She is obese.   Neck:      Vascular: No carotid bruit.   Cardiovascular:      Rate and Rhythm: Normal rate and regular rhythm.      Heart sounds: Normal heart sounds. No murmur heard.  Pulmonary:      Effort: Pulmonary effort is normal. No respiratory distress.      Breath sounds: Normal breath sounds.   Musculoskeletal:      Right lower leg: No edema.      Left lower leg: No edema.   Neurological:      Mental Status: She is alert.   Psychiatric:         Mood and Affect: Mood is anxious.         Behavior: Behavior normal.         Result Review :   Results for orders placed or performed in visit on 07/23/25   POC Glycosylated Hemoglobin (Hb A1C)    Collection Time: 07/23/25 12:07 PM    Specimen: Blood   Result Value Ref Range    Hemoglobin A1C 5.7 4.5 - 5.7 %    Lot Number [id number]     Expiration Date 4/30/27        The following data was reviewed by: MING Gonzalez on 07/23/2025:                       Assessment and Plan      Assessment & " Plan  Essential (primary) hypertension  Repeat BP, start checking BP at home, continue rx's      Orders:    amLODIPine-benazepril (LOTREL 5-20) 5-20 MG per capsule; Take 1 capsule by mouth Daily.    Anxiety  continue rx's   Orders:    escitalopram (LEXAPRO) 20 MG tablet; TAKE 1 TABLET BY MOUTH DAILY    Obesity, morbid, BMI 40.0-49.9  Patient's (Body mass index is 48.58 kg/m².) indicates that they are morbidly/severely obese (BMI > 40 or > 35 with obesity - related health condition) with health conditions that include hypertension and dyslipidemias . Weight is worsening. BMI  is above average; BMI management plan is completed. We discussed portion control, increasing exercise, and and will try to get GLP 1 approved to try again, may need to see another provider to work with her on coverage of rx.          Pre-diabetes  Discussed diet, exercise, repeat A1C today, discussed Rx, to try GLP 1 , again, Ozempic not covered by [employer] is   Orders:    POC Glycosylated Hemoglobin (Hb A1C)    Semaglutide-Weight Management (Wegovy) 0.25 MG/0.5ML solution auto-injector; Inject 0.5 mL under the skin into the appropriate area as directed 1 (One) Time Per Week.    History of stroke  Send for PT   Orders:    Ambulatory Referral to Physical Therapy for Evaluation & Treatment                        Follow Up     Return for Next scheduled follow up.    Patient was given instructions and counseling regarding her condition or for health maintenance advice. Please see specific information pulled into the AVS if appropriate.

## 2025-07-23 NOTE — TELEPHONE ENCOUNTER
[facility]    Key: [id number]

## 2025-07-23 NOTE — TELEPHONE ENCOUNTER
Ozempic will not be covered because patient is not diabetic.    We could try to get Wegovy approved since she has a history of stroke.

## 2025-07-23 NOTE — ASSESSMENT & PLAN NOTE
Patient's (Body mass index is 48.58 kg/m².) indicates that they are morbidly/severely obese (BMI > 40 or > 35 with obesity - related health condition) with health conditions that include hypertension and dyslipidemias . Weight is worsening. BMI  is above average; BMI management plan is completed. We discussed portion control, increasing exercise, and and will try to get GLP 1 approved to try again, may need to see another provider to work with her on coverage of rx.

## 2025-07-23 NOTE — ASSESSMENT & PLAN NOTE
Repeat BP, start checking BP at home, continue rx's      Orders:    amLODIPine-benazepril (LOTREL 5-20) 5-20 MG per capsule; Take 1 capsule by mouth Daily.

## 2025-07-23 NOTE — ASSESSMENT & PLAN NOTE
Discussed diet, exercise, repeat A1C today, discussed Rx, to try GLP 1 , again, Ozempic not covered by [employer] is   Orders:    POC Glycosylated Hemoglobin (Hb A1C)    Semaglutide-Weight Management (Wegovy) 0.25 MG/0.5ML solution auto-injector; Inject 0.5 mL under the skin into the appropriate area as directed 1 (One) Time Per Week.

## 2025-08-12 ENCOUNTER — TREATMENT (OUTPATIENT)
Dept: PHYSICAL THERAPY | Facility: CLINIC | Age: 70
End: 2025-08-12
Payer: MEDICARE

## 2025-08-12 DIAGNOSIS — Z86.73 HISTORY OF STROKE: Primary | ICD-10-CM

## 2025-08-12 DIAGNOSIS — R27.8 DECREASED COORDINATION: ICD-10-CM

## 2025-08-12 DIAGNOSIS — R53.81 PHYSICAL DECONDITIONING: ICD-10-CM

## 2025-08-12 DIAGNOSIS — R53.1 GENERALIZED WEAKNESS: ICD-10-CM

## 2025-08-12 DIAGNOSIS — R26.89 BALANCE PROBLEM: ICD-10-CM

## 2025-08-19 ENCOUNTER — TREATMENT (OUTPATIENT)
Dept: PHYSICAL THERAPY | Facility: CLINIC | Age: 70
End: 2025-08-19
Payer: MEDICARE

## 2025-08-19 DIAGNOSIS — R27.8 DECREASED COORDINATION: ICD-10-CM

## 2025-08-19 DIAGNOSIS — R26.89 BALANCE PROBLEM: ICD-10-CM

## 2025-08-19 DIAGNOSIS — Z86.73 HISTORY OF STROKE: Primary | ICD-10-CM

## 2025-08-19 DIAGNOSIS — R53.1 GENERALIZED WEAKNESS: ICD-10-CM

## 2025-08-19 DIAGNOSIS — R53.81 PHYSICAL DECONDITIONING: ICD-10-CM

## 2025-08-19 PROCEDURE — 97530 THERAPEUTIC ACTIVITIES: CPT | Performed by: PHYSICAL THERAPIST

## 2025-08-19 PROCEDURE — 97110 THERAPEUTIC EXERCISES: CPT | Performed by: PHYSICAL THERAPIST

## 2025-08-26 ENCOUNTER — TELEPHONE (OUTPATIENT)
Dept: PHYSICAL THERAPY | Facility: CLINIC | Age: 70
End: 2025-08-26